# Patient Record
Sex: MALE | Race: WHITE | Employment: FULL TIME | URBAN - METROPOLITAN AREA
[De-identification: names, ages, dates, MRNs, and addresses within clinical notes are randomized per-mention and may not be internally consistent; named-entity substitution may affect disease eponyms.]

---

## 2018-08-18 ENCOUNTER — OFFICE VISIT (OUTPATIENT)
Dept: ORTHOPEDICS | Facility: CLINIC | Age: 58
End: 2018-08-18
Payer: OTHER MISCELLANEOUS

## 2018-08-18 VITALS
WEIGHT: 168.8 LBS | BODY MASS INDEX: 25 KG/M2 | DIASTOLIC BLOOD PRESSURE: 90 MMHG | HEART RATE: 69 BPM | HEIGHT: 69 IN | SYSTOLIC BLOOD PRESSURE: 149 MMHG

## 2018-08-18 DIAGNOSIS — G56.02 LEFT CARPAL TUNNEL SYNDROME: ICD-10-CM

## 2018-08-18 DIAGNOSIS — R20.2 PARESTHESIAS IN LEFT HAND: Primary | ICD-10-CM

## 2018-08-18 PROCEDURE — 99203 OFFICE O/P NEW LOW 30 MIN: CPT | Performed by: PHYSICAL MEDICINE & REHABILITATION

## 2018-08-18 NOTE — MR AVS SNAPSHOT
After Visit Summary   8/18/2018    Bandar Warren    MRN: [id number]           Patient Information     Date Of Birth          1960        Visit Information        Provider Department      8/18/2018 8:00 AM Noemy Medley MD [facility] [place]        Today's Diagnoses     Paresthesias in left hand    -  1      Care Instructions    -EMG ordered of the left upper extremity.  -Neutral wrist brace provided to wear at night time.  -Home exercises provided.  Please do 5-6 days of exercises per week.  -Also discussed steroid injection and referral to orthopedic surgery.    Follow up at least 1-2 business days after the EMG has been completed to ensure we have the report.  Please call with questions or concerns.     -Patient to follow up with Primary Care provider regarding elevated blood pressure.            Follow-ups after your visit        Additional Services     ORTHO  REFERRAL       [facility] is referring you to the Orthopedic  Services at [facility].       The  Representative will assist you in the coordination of your Orthopedic and Musculoskeletal Care as prescribed by your physician.    The  Representative will call you within 24 hours to help schedule your appointment, or you may contact the  Representative at:    [place] [facility] ~ [phone]  [facility] ~ [phone]  [facility] ~ [phone]    Type of Referral : Patient needs Left upper extremity EMG to evaluate for paresthesia of the left hand. Please schedule patient for EMG with location and provider convenient for the patient.    Please fax EMG results to: Dr. Medley - [phone]         Timeframe requested: 3 - 5 days     Coverage of these services is subject to the terms and limitations of your health insurance plan.  Please call member services at your health plan with any  "benefit or coverage questions.      If X-rays, CT or MRI's have been performed, please contact the facility where they were done to arrange for , prior to your scheduled appointment.  Please bring this referral request to your appointment and present it to your specialist.                  Who to contact     If you have questions or need follow up information about today's clinic visit or your schedule please contact [facility] directly at [phone].  Normal or non-critical lab and imaging results will be communicated to you by SocialVolthart, letter or phone within 4 business days after the clinic has received the results. If you do not hear from us within 7 days, please contact the clinic through [employer] or phone. If you have a critical or abnormal lab result, we will notify you by phone as soon as possible.  Submit refill requests through [employer] or call your pharmacy and they will forward the refill request to us. Please allow 3 business days for your refill to be completed.          Additional Information About Your Visit        Kextil Information     [employer]il lets you send messages to your doctor, view your test results, renew your prescriptions, schedule appointments and more. To sign up, go to [email or web address] . Click on \"Log in\" on the left side of the screen, which will take you to the Welcome page. Then click on \"Sign up Now\" on the right side of the page.     You will be asked to enter the access code listed below, as well as some personal information. Please follow the directions to create your username and password.     Your access code is: [email or web address]  Expires: 2018  8:27 AM     Your access code will  in 90 days. If you need help or a new code, please call your [place] clinic or [phone].        Care EveryWhere ID     This is your Care EveryWhere ID. This could be used by other organizations to access your [place] medical " "records  [id number]        Your Vitals Were     Pulse Height BMI (Body Mass Index)             69 5' 8.54\" (1.741 m) 25.26 kg/m2          Blood Pressure from Last 3 Encounters:   08/18/18 149/90    Weight from Last 3 Encounters:   08/18/18 168 lb 12.8 oz (76.6 kg)              We Performed the Following     ORTHO  REFERRAL        Primary Care Provider Fax #    Physician No Ref-Primary [phone]       No address on file        Equal Access to Services     SUDARSHAN CHICAS : Hadii aad ku hadasho Soomaali, waaxda luqadaha, qaybta kaalmada adeegyada, waxjanis idiin hayaan nixon lawkendrickgilberto ingram . So [facility] [phone].    ATENCIÓN: Si habla español, tiene a acuña disposición servicios gratuitos de asistencia lingüística. Llame al [phone].    We comply with applicable federal civil rights laws and [place] laws. We do not discriminate on the basis of race, color, national origin, age, disability, sex, sexual orientation, or gender identity.            Thank you!     Thank you for choosing [facility]  for your care. Our goal is always to provide you with excellent care. Hearing back from our patients is one way we can continue to improve our services. Please take a few minutes to complete the written survey that you may receive in the mail after your visit with us. Thank you!             Your Updated Medication List - Protect others around you: Learn how to safely use, store and throw away your medicines at www.disposemymeds.org.      Notice  As of 8/18/2018  8:27 AM    You have not been prescribed any medications.      "

## 2018-08-18 NOTE — PROGRESS NOTES
Sports Medicine Clinic Visit      CC: Patient presents with:  Left Hand - Pain      HPI:  Bandar Warren is a 58 year old male who is seen as a self referral.   He notes left hand pain that has been bothering him off and on for ~ 10 years. He notes numbness that has gotten worse in the past week. He notes numbness in the 1st 3 fingers of the left hand.  Symptoms are relieved with no treatment tried to date. Symptoms are worsened by holding a paint tray in his hand for prolonged periods. He endorses numbness and tingling.   He denies swelling, bruising, popping, grinding, catching, locking, instability and weakness.  Other treatment has included nothing.       Review of Systems:  Musculoskeletal: as above  Remainder of review of systems is negative including constitutional, eyes, ENT, CV, pulmonary, GI, , endocrine, skin, hematologic, and neurologic except as noted in HPI or medical history.    History reviewed. No pertinent past surgical/medical/family/social history other than as mentioned in HPI.    There is no problem list on file for this patient.    Past Medical History:   Diagnosis Date     NO ACTIVE PROBLEMS      Past Surgical History:   Procedure Laterality Date     APPENDECTOMY  1970's     ORTHOPEDIC SURGERY      Knee - ACL and MCL     No family history on file.  Social History     Social History     Marital status:      Spouse name: N/A     Number of children: N/A     Years of education: N/A     Occupational History     Not on file.     Social History Main Topics     Smoking status: Never Smoker     Smokeless tobacco: Current User     Alcohol use Not on file     Drug use: Not on file     Sexual activity: Not on file     Other Topics Concern     Not on file     Social History Narrative     No narrative on file       He works as a     No current outpatient prescriptions on file.     No current facility-administered medications for this visit.      Allergies   Allergen Reactions     Poison Ivy  "Extract [Extract Of Poison Ivy]          Objective:  /90  Pulse 69  Ht 5' 8.54\" (1.741 m)  Wt 168 lb 12.8 oz (76.6 kg)  BMI 25.26 kg/m2    General: Alert and in no distress    Head: Normocephalic, atraumatic  Eyes: no scleral icterus or conjunctival erythema   Oropharynx:  Mucous membranes moist  Skin: no erythema, petechiae, or jaundice  CV: regular rhythm by palpation, 2+ distal pulses  Resp: normal respiratory effort without conversational dyspnea   Psych: normal mood and affect    Neuro: Motor strength and sensation as noted below    Musculoskeletal:    Bilateral Wrist and Hand exam    Inspection:       Left volar radial cyst    ROM:       Full and symmetric active and passive range of motion of the forearm, wrist and digits bilaterally    Strength:  Forearm supination 5/5 bilaterally  Forearm pronation 5/5 bilaterally  Wrist extension 5/5 bilaterally  Wrist flexion 5/5 bilaterally   strength 5/5 bilaterally  Finger abduction 5/5 bilaterally  Thumb opposition 5/5 bilaterally    Special Tests:        neg (-) Tinel's test bilaterally        positive (+) Phalen's test left    Neurovascular:       2+ radial pulses bilaterally      Altered sensation to light touch over the left 1st-3rd digits and radial hand      Radiology:  No imaging today    Assessment:  1. Paresthesias in left hand    2. Left carpal tunnel syndrome        Plan:  Discussed the assessment with the patient and developed a plan together:  -Discussed anatomy and pathophysiology of carpal tunnel and median nerve.  -Neutral wrist brace provided to wear at night time.  -Home exercises provided.  Please do 5-6 days of exercises per week.  -Also discussed EMG to confirm diagnosis if surgery is being considered.  He would like to proceed with this at this time.  Ordered a left upper extremity EMG.  -Also discussed steroid injection and referral to orthopedic surgery.    Follow up at least 1-2 business days after the EMG has been completed to " ensure we have the report.  Please call with questions or concerns.     -Patient to follow up with Primary Care provider regarding elevated blood pressure.    Theodora Medley MD, CAQ Sports Medicine  [facility]

## 2018-08-18 NOTE — PATIENT INSTRUCTIONS
-EMG ordered of the left upper extremity.  -Neutral wrist brace provided to wear at night time.  -Home exercises provided.  Please do 5-6 days of exercises per week.  -Also discussed steroid injection and referral to orthopedic surgery.    Follow up at least 1-2 business days after the EMG has been completed to ensure we have the report.  Please call with questions or concerns.     -Patient to follow up with Primary Care provider regarding elevated blood pressure.

## 2018-08-18 NOTE — LETTER
8/18/2018         RE: Bandar Warren  [address]  [place] [address]        Dear Colleague,    Thank you for referring your patient, Bandar Warren, to the [facility]. Please see a copy of my visit note below.    Sports Medicine Clinic Visit      CC: Patient presents with:  Left Hand - Pain      HPI:  Bandar Warren is a 58 year old male who is seen as a self referral.   He notes left hand pain that has been bothering him off and on for ~ 10 years. He notes numbness that has gotten worse in the past week. He notes numbness in the 1st 3 fingers of the left hand.  Symptoms are relieved with no treatment tried to date. Symptoms are worsened by holding a paint tray in his hand for prolonged periods. He endorses numbness and tingling.   He denies swelling, bruising, popping, grinding, catching, locking, instability and weakness.  Other treatment has included nothing.       Review of Systems:  Musculoskeletal: as above  Remainder of review of systems is negative including constitutional, eyes, ENT, CV, pulmonary, GI, , endocrine, skin, hematologic, and neurologic except as noted in HPI or medical history.    History reviewed. No pertinent past surgical/medical/family/social history other than as mentioned in HPI.    There is no problem list on file for this patient.    Past Medical History:   Diagnosis Date     NO ACTIVE PROBLEMS      Past Surgical History:   Procedure Laterality Date     APPENDECTOMY  1970's     ORTHOPEDIC SURGERY      Knee - ACL and MCL     No family history on file.  Social History     Social History     Marital status:      Spouse name: N/A     Number of children: N/A     Years of education: N/A     Occupational History     Not on file.     Social History Main Topics     Smoking status: Never Smoker     Smokeless tobacco: Current User     Alcohol use Not on file     Drug use: Not on file     Sexual activity: Not on file     Other Topics Concern      "Not on file     Social History Narrative     No narrative on file       He works as a     No current outpatient prescriptions on file.     No current facility-administered medications for this visit.      Allergies   Allergen Reactions     Poison Ivy Extract [Extract Of Poison Ivy]          Objective:  /90  Pulse 69  Ht 5' 8.54\" (1.741 m)  Wt 168 lb 12.8 oz (76.6 kg)  BMI 25.26 kg/m2    General: Alert and in no distress    Head: Normocephalic, atraumatic  Eyes: no scleral icterus or conjunctival erythema   Oropharynx:  Mucous membranes moist  Skin: no erythema, petechiae, or jaundice  CV: regular rhythm by palpation, 2+ distal pulses  Resp: normal respiratory effort without conversational dyspnea   Psych: normal mood and affect    Neuro: Motor strength and sensation as noted below    Musculoskeletal:    Bilateral Wrist and Hand exam    Inspection:       Left volar radial cyst    ROM:       Full and symmetric active and passive range of motion of the forearm, wrist and digits bilaterally    Strength:  Forearm supination 5/5 bilaterally  Forearm pronation 5/5 bilaterally  Wrist extension 5/5 bilaterally  Wrist flexion 5/5 bilaterally   strength 5/5 bilaterally  Finger abduction 5/5 bilaterally  Thumb opposition 5/5 bilaterally    Special Tests:        neg (-) Tinel's test bilaterally        positive (+) Phalen's test left    Neurovascular:       2+ radial pulses bilaterally      Altered sensation to light touch over the left 1st-3rd digits and radial hand      Radiology:  No imaging today    Assessment:  1. Paresthesias in left hand    2. Left carpal tunnel syndrome        Plan:  Discussed the assessment with the patient and developed a plan together:  -Discussed anatomy and pathophysiology of carpal tunnel and median nerve.  -Neutral wrist brace provided to wear at night time.  -Home exercises provided.  Please do 5-6 days of exercises per week.  -Also discussed EMG to confirm diagnosis if " surgery is being considered.  He would like to proceed with this at this time.  Ordered a left upper extremity EMG.  -Also discussed steroid injection and referral to orthopedic surgery.    Follow up at least 1-2 business days after the EMG has been completed to ensure we have the report.  Please call with questions or concerns.     -Patient to follow up with Primary Care provider regarding elevated blood pressure.    Theodora Medley MD, [facility] Sports Medicine  [facility]      Again, thank you for allowing me to participate in the care of your patient.        Sincerely,        Noemy Medley MD

## 2018-09-05 ENCOUNTER — TRANSFERRED RECORDS (OUTPATIENT)
Dept: HEALTH INFORMATION MANAGEMENT | Facility: CLINIC | Age: 58
End: 2018-09-05

## 2018-09-10 ENCOUNTER — OFFICE VISIT (OUTPATIENT)
Dept: ORTHOPEDICS | Facility: CLINIC | Age: 58
End: 2018-09-10
Payer: OTHER MISCELLANEOUS

## 2018-09-10 VITALS
SYSTOLIC BLOOD PRESSURE: 132 MMHG | DIASTOLIC BLOOD PRESSURE: 92 MMHG | BODY MASS INDEX: 25.41 KG/M2 | HEART RATE: 97 BPM | RESPIRATION RATE: 16 BRPM | WEIGHT: 169.8 LBS

## 2018-09-10 DIAGNOSIS — R22.32 MASS OF HAND, LEFT: ICD-10-CM

## 2018-09-10 DIAGNOSIS — G56.02 CARPAL TUNNEL SYNDROME OF LEFT WRIST: ICD-10-CM

## 2018-09-10 DIAGNOSIS — M67.432 GANGLION CYST OF WRIST, LEFT: Primary | ICD-10-CM

## 2018-09-10 PROCEDURE — 99244 OFF/OP CNSLTJ NEW/EST MOD 40: CPT | Performed by: ORTHOPAEDIC SURGERY

## 2018-09-10 ASSESSMENT — PAIN SCALES - GENERAL: PAINLEVEL: MILD PAIN (3)

## 2018-09-10 NOTE — LETTER
9/10/2018         RE: Bandar Warren  [address] [place] [address]        Dear Colleague,    Thank you for referring your patient, Bandar Warren, to the [facility]. Please see a copy of my visit note below.    CHIEF COMPLAINT:   Chief Complaint   Patient presents with     Left Hand - Pain     Referred by Dr Medley for left CTS. Left hand pain and numbness bothering him off and on 10 yrs. Worsening over the last 3 weeks. No treatments. EMG done 9/5/18 at [facility].     Bandar Warren is seen today in the [facility] for evaluation of left carpal tunnel syndrome  at the request of Dr. Theodora Medley MD      HISTORY:  Bandar Warren is a 58 year old male , right-hand dominant, with left hand pain, numbness and tingling. Symptoms have been present for 10+ years, intermittent. Symptoms have progressed over the last 3 weeks. He has constant numbness in the radial 3 fingers. He reports a history of a volar- radial ganglion cyst that has shrunk recently. Mild pain today, 3/10. He has pain at night. Denies right sided issues.       Suspected cause: Due to unknown activities.    Pain severity: mild pain  Pain quality: aching  Frequency of symptoms: frequently to constantly  Aggravating Factors: with using the left hand.  Relieving Factors: rest  Previous modalities tried: night brace  Prior wrist injury/trauma: none    Usual level of recreational activity: sedentary  Usual level of work activity: Occasional climbing -     Orthopedic PMH: none    Other PMH:  has a past medical history of NO ACTIVE PROBLEMS.      Surgical Hx:  has a past surgical history that includes appendectomy (1970's) and orthopedic surgery.    Medications: No current outpatient prescriptions on file.    Allergies:   Allergies   Allergen Reactions     Poison Ivy Extract [Extract Of Poison Ivy]        Social Hx: .  reports that he has never smoked. He uses smokeless  tobacco.    Family Hx: reviewed, none pertinent.    REVIEW OF SYSTEMS: 10 point ROS neg other than the symptoms noted above in the HPI and PMH. Notables include  CONSTITUTIONAL:NEGATIVE for fever, chills, change in weight  INTEGUMENTARY/SKIN: NEGATIVE for worrisome rashes, moles or lesions  MUSCULOSKELETAL:See HPI above  Neurology: see HPI above.    This document serves as a record of the services and decisions personally performed and made by Missael Mcdonald MD. It was created on his behalf by Elsa Colon, a trained medical scribe. The creation of this document is based the provider's statements to the medical scribe.    Scribe Elsa Colon 3:16 PM 9/10/2018     EXAM:  BP (!) 132/92 (BP Location: Right arm, Patient Position: Sitting, Cuff Size: Adult Regular)  Pulse 97  Resp 16  Wt 169 lb 12.8 oz (77 kg)  BMI 25.41 kg/m2  GENERAL APPEARANCE: healthy, alert and no distress   GAIT: NORMAL  SKIN: no suspicious lesions or rashes; some dried white paint  RESPIRATORY: No increased work of breathing.  NEURO:     strength: normal right, decreased left.   thenar fasiculations: negative    Thenar atrophy: slight right, mild-moderate left    Sensation intact in ulnar 2 fingers, decreased in radial 3 fingers left hand; intact all right hand.     reflexes normal in upper extremities.   PSYCH:  mentation appears normal and affect normal, not anxious.  LYMPH: no palpable epitrochlear lymphadenopathy about the left elbow.    MUSCULOSKELETAL:    RIGHT HAND/FINGERS:    Skin intact. No abnormal skin discoloration, erythema or ecchymosis.   Degenerative deformity of the thumb.  No nail pitting or clubbing.  Normal wear pattern, color and tone.  No observable or palpable masses of the fingers or palm or wrist.  No palpable triggering of fingers.   No observable or palpable cords or nodules of the fingers or palm.    There is no swelling in the wrist, hand, forearm.  There is no tenderness in the wrist.  There is no  ecchymosis.  There is no erythema of the surrounding skin.  There is no maceration of the skin.  There is no deformity in the area.  Intact extensors. No extensor lag.    Special tests wrist:    Tinel's negative,    Phalen's not tested.    Flexion/compression test negative.    1st carpometacarpal grind: mild.    Intact sensation light touch median, radial, ulnar nerves of the hand  Intact sensation to the radial and ulnar digital nerves of the fingers, as well as the finger tips.  Intact epl fpl fdp edc wrist flexion/extension biceps/triceps deltoid  Brisk capillary refill to all fingers.   Palpable radial pulse, 2+.      LEFT HAND/FINGERS:    Skin intact. No abnormal skin discoloration, erythema or ecchymosis.   Degenerative deformity of the thumb.  No nail pitting or clubbing.  Normal wear pattern, color and tone.  ~1.5 cm cystic mass located volar-radial wrist, adjacent to palpable radial pulse, just ulnar to mass.   Several millimeter cutaneous nodule of the palm, just radial to flexor tendon of the small finger.  No palpable triggering of fingers.     There is no swelling in the wrist, hand, forearm.  There is no tenderness in the wrist.  There is no ecchymosis.  There is no erythema of the surrounding skin.  There is no maceration of the skin.  There is no deformity in the area.  Intact extensors. No extensor lag.    Special tests wrist:    Tinel's negative,    Flexion/compression test negative.    1st carpometacarpal grind: negative    Intact sensation light touch radial, ulnar nerves of the hand; decreased sensation of the median nerve distribution of the finger tips of the thumb, index and long fingers.  Intact epl fpl fdp edc wrist flexion/extension biceps/triceps deltoid  Brisk capillary refill to all fingers.   Palpable radial pulse, 2+.    XRAYS: none taken today.    SPECIAL STUDIES:  EMG done on 9/6/2018 by [facility].  Impression:  1. Severe left median neuropathy across the wrist  (carpal tunnel syndrome), with severe focal motor demyelination and loss of sensory/motor axons   2. There is no evidence of brachial plexopathy or cervical radiculopathy.       ASSESSMENT: 58 year old male with:  1. Severe left carpal tunnel syndrome  2. Left volar radial wrist mass, consistent with ganglion cyst  3. Left hand, palmar, cutaneous mass    PLAN:   Numbness and tingling:  * discussed with patient signs and symptoms consistent with carpal tunnel syndrome. Carpal tunnel syndrome is compression or pinching of the median nerve at the wrist, as it enters the hand. There are many different causes, and in most cases, multifactorial.    * An indepth discussion was had with him about the options for treatment, which included activity modification to avoid aggravating activities, taking breaks during activities that cause symptoms, stretching, NSAIDS to help decrease inflammation and swelling within the carpal tunnel, night splinting, corticosteroid injections, and carpal tunnel release.   * depending upon severity and duration of symptoms, nonoperative treatment is usually initiated, starting with least invasive modalities such as activity modification and a trial of night splints and NSAIDs.  * Cortisone injections are considered to decrease swelling and inflammation within the carpal tunnel and compression of the nerve.   * Lastly, carpal tunnel release should symptoms persist despite trial of nonoperative treatment, or in cases of severe carpal tunnel syndrome.  * risks of surgery, including, but not limited to: bleeding, infection, pain, scar, damage to adjacent structures (nerves, vessels, tendons), temporary versus permanent nerve injury, failure to relieve symptoms, recurrence of symptoms, incomplete release, stiffness, scar sensitivity and tenderness, need for further surgery, risks of anesthesia were discussed.  * in some cases, with severe, prolonged symptoms, or in situations of underlying peripheral  neuropathy, there may be permanent nerve changes not amenable to surgery, that even with surgery, may not resolve. He has some changes noted on EMG that might indicate that even with surgery, in his case, may not have complete resolution of symptoms.    VOLAR WRIST MASS - ganglion cyst  Discussed with patient that the mass is consistent with ganglion cyst, a common, benign tumor of the upper extremity. Less likely another type of tumor or neoplasm. Treatment options include: observation if asymptomatic or minimal symptoms, activity modification, splint, aspiration with cortisone injection (risks of recurrence > 50%), or surgical excision (risk of recurrence < 5-10%). Risks and benefits of each discussed in detail.    * in particular, risks of surgery include, but not limited to: bleeding, infection, pain, scar, damage to adjacent structures (nerves, vessels, bone, cartilage, tendons, ligaments), temporary versus permanent nerve injury, failure to relieve symptoms, recurrence of symptoms or recurrence of the mass, stiffness, contracture, need for further surgery, risks of anesthesia, blood clots, death.    HAND MASS  * discussed hand mass likely callus/foreign body/fibrous nodule versus other.  * treatment with observation, surgical excision.  Risks and benefits of each discussed in detail.    * in particular, risks of surgery include, but not limited to: bleeding, infection, pain, scar, damage to adjacent structures (nerves, vessels, bone, cartilage, tendons, ligaments), temporary versus permanent nerve injury, failure to relieve symptoms, recurrence of symptoms or recurrence of the mass, stiffness, contracture, need for further surgery, risks of anesthesia, blood clots, death.      After lengthy discussion, patient elects for surgery.    * plan: open left carpal tunnel release, left volar ganglion cyst excision, left palmar mass excision, outpatient procedure, MAC with [facility]  * will schedule in future at a  mutual convenience  * patient to obtain H+P prior to surgery  * return to clinic 2 weeks postoperative for wound check, suture removal, sooner if needed.  * all patient's questions addressed and answered today.  * Patient to follow up with Primary Care provider regarding elevated blood pressure     The information in this document, created by a scribe for me, accurately reflects the services I personally performed and the decisions made by me. I have reviewed and approved this document for accuracy.      Missael Mcdonald M.D., M.S.  Dept. of Orthopaedic Surgery  [facility]      Again, thank you for allowing me to participate in the care of your patient.        Sincerely,        Missael Mcdonald MD

## 2018-09-10 NOTE — PROGRESS NOTES
CHIEF COMPLAINT:   Chief Complaint   Patient presents with     Left Hand - Pain     Referred by Dr Medley for left CTS. Left hand pain and numbness bothering him off and on 10 yrs. Worsening over the last 3 weeks. No treatments. EMG done 9/5/18 at [facility].     Bandar Warren is seen today in the [facility] for evaluation of left carpal tunnel syndrome  at the request of Dr. Theodora Medley MD      HISTORY:  Bandar Warren is a 58 year old male , right-hand dominant, with left hand pain, numbness and tingling. Symptoms have been present for 10+ years, intermittent. Symptoms have progressed over the last 3 weeks. He has constant numbness in the radial 3 fingers. He reports a history of a volar- radial ganglion cyst that has shrunk recently. Mild pain today, 3/10. He has pain at night. Denies right sided issues.       Suspected cause: Due to unknown activities.    Pain severity: mild pain  Pain quality: aching  Frequency of symptoms: frequently to constantly  Aggravating Factors: with using the left hand.  Relieving Factors: rest  Previous modalities tried: night brace  Prior wrist injury/trauma: none    Usual level of recreational activity: sedentary  Usual level of work activity: Occasional climbing - [place]    Orthopedic PMH: none    Other PMH:  has a past medical history of NO ACTIVE PROBLEMS.      Surgical Hx:  has a past surgical history that includes appendectomy (1970's) and orthopedic surgery.    Medications: No current outpatient prescriptions on file.    Allergies:   Allergies   Allergen Reactions     Poison Ivy Extract [Extract Of Poison Ivy]        Social Hx: .  reports that he has never smoked. He uses smokeless tobacco.    Family Hx: reviewed, none pertinent.    REVIEW OF SYSTEMS: 10 point ROS neg other than the symptoms noted above in the HPI and PMH. Notables include  CONSTITUTIONAL:NEGATIVE for fever, chills, change in weight  INTEGUMENTARY/SKIN: NEGATIVE for  worrisome rashes, moles or lesions  MUSCULOSKELETAL:See HPI above  Neurology: see HPI above.    This document serves as a record of the services and decisions personally performed and made by Missael Mcdonald MD. It was created on his behalf by Elsa Colon, a trained medical scribe. The creation of this document is based the provider's statements to the medical scribe.    Scribe Elsa Colon 3:16 PM 9/10/2018     EXAM:  BP (!) 132/92 (BP Location: Right arm, Patient Position: Sitting, Cuff Size: Adult Regular)  Pulse 97  Resp 16  Wt 169 lb 12.8 oz (77 kg)  BMI 25.41 kg/m2  GENERAL APPEARANCE: healthy, alert and no distress   GAIT: NORMAL  SKIN: no suspicious lesions or rashes; some dried white paint  RESPIRATORY: No increased work of breathing.  NEURO:     strength: normal right, decreased left.   thenar fasiculations: negative    Thenar atrophy: slight right, mild-moderate left    Sensation intact in ulnar 2 fingers, decreased in radial 3 fingers left hand; intact all right hand.     reflexes normal in upper extremities.   PSYCH:  mentation appears normal and affect normal, not anxious.  LYMPH: no palpable epitrochlear lymphadenopathy about the left elbow.    MUSCULOSKELETAL:    RIGHT HAND/FINGERS:    Skin intact. No abnormal skin discoloration, erythema or ecchymosis.   Degenerative deformity of the thumb.  No nail pitting or clubbing.  Normal wear pattern, color and tone.  No observable or palpable masses of the fingers or palm or wrist.  No palpable triggering of fingers.   No observable or palpable cords or nodules of the fingers or palm.    There is no swelling in the wrist, hand, forearm.  There is no tenderness in the wrist.  There is no ecchymosis.  There is no erythema of the surrounding skin.  There is no maceration of the skin.  There is no deformity in the area.  Intact extensors. No extensor lag.    Special tests wrist:    Tinel's negative,    Phalen's not tested.    Flexion/compression test  negative.    1st carpometacarpal grind: mild.    Intact sensation light touch median, radial, ulnar nerves of the hand  Intact sensation to the radial and ulnar digital nerves of the fingers, as well as the finger tips.  Intact epl fpl fdp edc wrist flexion/extension biceps/triceps deltoid  Brisk capillary refill to all fingers.   Palpable radial pulse, 2+.      LEFT HAND/FINGERS:    Skin intact. No abnormal skin discoloration, erythema or ecchymosis.   Degenerative deformity of the thumb.  No nail pitting or clubbing.  Normal wear pattern, color and tone.  ~1.5 cm cystic mass located volar-radial wrist, adjacent to palpable radial pulse, just ulnar to mass.   Several millimeter cutaneous nodule of the palm, just radial to flexor tendon of the small finger.  No palpable triggering of fingers.     There is no swelling in the wrist, hand, forearm.  There is no tenderness in the wrist.  There is no ecchymosis.  There is no erythema of the surrounding skin.  There is no maceration of the skin.  There is no deformity in the area.  Intact extensors. No extensor lag.    Special tests wrist:    Tinel's negative,    Flexion/compression test negative.    1st carpometacarpal grind: negative    Intact sensation light touch radial, ulnar nerves of the hand; decreased sensation of the median nerve distribution of the finger tips of the thumb, index and long fingers.  Intact epl fpl fdp edc wrist flexion/extension biceps/triceps deltoid  Brisk capillary refill to all fingers.   Palpable radial pulse, 2+.    XRAYS: none taken today.    SPECIAL STUDIES:  EMG done on 9/6/2018 by [facility].  Impression:  1. Severe left median neuropathy across the wrist (carpal tunnel syndrome), with severe focal motor demyelination and loss of sensory/motor axons   2. There is no evidence of brachial plexopathy or cervical radiculopathy.       ASSESSMENT: 58 year old male with:  1. Severe left carpal tunnel syndrome  2. Left volar  radial wrist mass, consistent with ganglion cyst  3. Left hand, palmar, cutaneous mass    PLAN:   Numbness and tingling:  * discussed with patient signs and symptoms consistent with carpal tunnel syndrome. Carpal tunnel syndrome is compression or pinching of the median nerve at the wrist, as it enters the hand. There are many different causes, and in most cases, multifactorial.    * An indepth discussion was had with him about the options for treatment, which included activity modification to avoid aggravating activities, taking breaks during activities that cause symptoms, stretching, NSAIDS to help decrease inflammation and swelling within the carpal tunnel, night splinting, corticosteroid injections, and carpal tunnel release.   * depending upon severity and duration of symptoms, nonoperative treatment is usually initiated, starting with least invasive modalities such as activity modification and a trial of night splints and NSAIDs.  * Cortisone injections are considered to decrease swelling and inflammation within the carpal tunnel and compression of the nerve.   * Lastly, carpal tunnel release should symptoms persist despite trial of nonoperative treatment, or in cases of severe carpal tunnel syndrome.  * risks of surgery, including, but not limited to: bleeding, infection, pain, scar, damage to adjacent structures (nerves, vessels, tendons), temporary versus permanent nerve injury, failure to relieve symptoms, recurrence of symptoms, incomplete release, stiffness, scar sensitivity and tenderness, need for further surgery, risks of anesthesia were discussed.  * in some cases, with severe, prolonged symptoms, or in situations of underlying peripheral neuropathy, there may be permanent nerve changes not amenable to surgery, that even with surgery, may not resolve. He has some changes noted on EMG that might indicate that even with surgery, in his case, may not have complete resolution of symptoms.    VOLAR WRIST  MASS - ganglion cyst  Discussed with patient that the mass is consistent with ganglion cyst, a common, benign tumor of the upper extremity. Less likely another type of tumor or neoplasm. Treatment options include: observation if asymptomatic or minimal symptoms, activity modification, splint, aspiration with cortisone injection (risks of recurrence > 50%), or surgical excision (risk of recurrence < 5-10%). Risks and benefits of each discussed in detail.    * in particular, risks of surgery include, but not limited to: bleeding, infection, pain, scar, damage to adjacent structures (nerves, vessels, bone, cartilage, tendons, ligaments), temporary versus permanent nerve injury, failure to relieve symptoms, recurrence of symptoms or recurrence of the mass, stiffness, contracture, need for further surgery, risks of anesthesia, blood clots, death.    HAND MASS  * discussed hand mass likely callus/foreign body/fibrous nodule versus other.  * treatment with observation, surgical excision.  Risks and benefits of each discussed in detail.    * in particular, risks of surgery include, but not limited to: bleeding, infection, pain, scar, damage to adjacent structures (nerves, vessels, bone, cartilage, tendons, ligaments), temporary versus permanent nerve injury, failure to relieve symptoms, recurrence of symptoms or recurrence of the mass, stiffness, contracture, need for further surgery, risks of anesthesia, blood clots, death.      After lengthy discussion, patient elects for surgery.    * plan: open left carpal tunnel release, left volar ganglion cyst excision, left palmar mass excision, outpatient procedure, MAC with [place] [facility]  * will schedule in future at a mutual convenience  * patient to obtain H+P prior to surgery  * return to clinic 2 weeks postoperative for wound check, suture removal, sooner if needed.  * all patient's questions addressed and answered today.  * Patient to follow up with Primary Care provider  regarding elevated blood pressure     The information in this document, created by a scribe for me, accurately reflects the services I personally performed and the decisions made by me. I have reviewed and approved this document for accuracy.      Missael Mcdonald M.D., M.S.  Dept. of Orthopaedic Surgery  [facility]

## 2018-09-11 ENCOUNTER — TELEPHONE (OUTPATIENT)
Dept: ORTHOPEDICS | Facility: CLINIC | Age: 58
End: 2018-09-11

## 2018-09-20 ENCOUNTER — TELEPHONE (OUTPATIENT)
Dept: FAMILY MEDICINE | Facility: CLINIC | Age: 58
End: 2018-09-20

## 2018-09-20 NOTE — TELEPHONE ENCOUNTER
Reason for call:  Note request   Patient called regarding (reason for call): Patient is requesting for his employer that he needs surgery. He is asking it be mailed to him when done.  Please call patient with any questions  Additional comments: please call if any questions      Phone number to reach patient:[phone]  Best Time: after 2pm    Can we leave a detailed message on this number?  YES

## 2018-09-20 NOTE — LETTER
[address]  [place] [address]  [phone]      WORKABILITY    [facility], Dr. Missael Mcdonald M.D., ARABELLA ClineLuanne Fridley        9/20/2018      RE: Bandar Warren    [address]  [place] [address]        To whom it may concern:     Bandar Warren is under my professional care for left carpal tunnel syndrome and left wrist mass.      Date of Surgery: TBD.    Mr. Warren was seen in clinic by Dr. Missael Mcdonald on 9/10/18. At that consult it was mutually decided upon to have left carpal tunnel release and left wrist mass excision surgery. A date for this procedure has not been established at this time. Mr. Warren is OK to schedule surgery at his convenience from Dr. Missael Mcdonald's perspective.           aJmes Rosas PA-C, CAQ (Ortho)  Supervising Physician: Missael Mcdonald M.D., M.S.  Dept. of Orthopaedic Surgery  [facility]

## 2018-09-20 NOTE — TELEPHONE ENCOUNTER
"I spoke to Bandar to discuss the note he wants for his employer. He basically wants a note to give them the \"heads up\" that he will be having surgery sometime in the future. I wrote this letter and sent it to him via postal mail along with a copy of his [place] EMG results.    James Rosas PA-C, CAQ (Ortho)  Supervising Physician: Missael Mcdonald M.D., M.S.  Dept. of Orthopaedic Surgery  [facility]    "

## 2018-10-15 NOTE — TELEPHONE ENCOUNTER
Type of surgery: open left carpal tunnel release, left palmar hand mass excision, left wrist ganglion cyst excision  CPT 18084, 46828, 79740  Ganglion cyst of wrist, left [M67.432]  - Primary       Carpal tunnel syndrome of left wrist [G56.02]       Mass of hand, left [R22.32]         Location of surgery: [facility]  Date and time of surgery: 11-13-18  Surgeon: Dr Mcdonald  Pre-Op Appt Date: TBD  Post-Op Appt Date: 11-26-18   Packet sent out: Yes  Pre-cert/Authorization completed: no pre cert needed, per online form for [employer]    Date: 10/16/2018

## 2018-10-22 NOTE — TELEPHONE ENCOUNTER
Paperwork faxed to [facility].     Thank you,   Katelin Hendricks   [facility]  [phone]

## 2018-11-09 ENCOUNTER — TELEPHONE (OUTPATIENT)
Dept: ORTHOPEDICS | Facility: CLINIC | Age: 58
End: 2018-11-09

## 2018-11-09 NOTE — TELEPHONE ENCOUNTER
[facility] is calling looking for patients Preop- I have lvm for patient to call and advise on his preop and noted to him that without a preop surgery will need to be canceled  Paperwork notes that patient was going to schedule preop with a  Outside of [place].[facility]

## 2018-11-09 NOTE — TELEPHONE ENCOUNTER
Reason for Call:  Other call back    Detailed comments: Patient is scheduled for surgery on 11/13/2018 with Dr. Mcdonald for a carpal tunnel release patient is requesting a removal of a sliver just below him ring finger. Please call and advise. Thank you     Phone Number Patient can be reached at: Home number on file [phone] (home)    Best Time: any    Can we leave a detailed message on this number? YES    Call taken on 11/9/2018 at 2:57 PM by Lucille Bernabe

## 2018-11-12 NOTE — TELEPHONE ENCOUNTER
I spoke to female at home number who said Bandar was at work. I let her know that this would be addressed tmrw in pre-op with Dr. Missael Mcdonald. She was appreciative of the call back and was going to pass along the information.    James Rosas PA-C, CAQ (Ortho)  Supervising Physician: Missael Mcdonald M.D., M.S.  Dept. of Orthopaedic Surgery  [facility]

## 2018-11-26 ENCOUNTER — OFFICE VISIT (OUTPATIENT)
Dept: ORTHOPEDICS | Facility: CLINIC | Age: 58
End: 2018-11-26
Payer: OTHER MISCELLANEOUS

## 2018-11-26 VITALS
DIASTOLIC BLOOD PRESSURE: 87 MMHG | SYSTOLIC BLOOD PRESSURE: 134 MMHG | HEIGHT: 69 IN | WEIGHT: 173 LBS | BODY MASS INDEX: 25.62 KG/M2 | RESPIRATION RATE: 16 BRPM

## 2018-11-26 DIAGNOSIS — Z98.890 S/P CARPAL TUNNEL RELEASE: Primary | ICD-10-CM

## 2018-11-26 DIAGNOSIS — Z98.890 S/P EXCISION OF GANGLION CYST: ICD-10-CM

## 2018-11-26 PROCEDURE — 99024 POSTOP FOLLOW-UP VISIT: CPT | Performed by: ORTHOPAEDIC SURGERY

## 2018-11-26 ASSESSMENT — PAIN SCALES - GENERAL: PAINLEVEL: NO PAIN (0)

## 2018-11-26 NOTE — LETTER
[facility]  [address]  [place] [id number] [phone]    WORKABILITY    [facility], Dr. Missael Mcdonald M.D., ARABELLA ClineLuanne ojeda [place]        11/26/2018      RE: Bandar Warren    [address]  [place] [address]        To whom it may concern:     Bandar Warren is under my professional care for   1. S/P carpal tunnel release    2.     Ganglion cyst excision  3.      Hand mass excision    Date of Surgery: 11/13/18.    Mr. Warren should not return to work at this time. He can return to work on 12/3/18 with restrictions: 1lb lifting restriction with left upper extremity. No repetitive use of left upper extremity. Hand must be kept clean and dry at all times.  DURATION OF LIMITATIONS: 4 weeks      Next appointment: 1 month        James Rosas PA-C, CAQ (Ortho)  Supervising Physician: Missael Mcdonald M.D., M.S.  Dept. of Orthopaedic Surgery  [facility]

## 2018-11-26 NOTE — LETTER
11/26/2018         RE: Bandar Warren  [address]  [place] [address]        Dear Colleague,    Thank you for referring your patient, Bandar Warren, to the [facility]. Please see a copy of my visit note below.    Chief Complaint   Patient presents with     Left Hand - Surgical Followup     Left carpal tunnel release, volar ganglion and palmar mass excision. DOS: 11/13/18. Doing well. Remained in splint. Still has numbness in thumb, index and long finger.        SURGERY: left carpal tunnel release, left wrist volar ganglion excision, left hand palmar mass excision. ( [facility])  DATE OF SURGERY: 11/13/2018      HPI: Bandar Warren is a 58 year old male , right -hand dominant, who presents for post-surgical follow-up of a left carpal tunnel release, left volar ganglion excision and left hand palmar mass excision.  It has now been 2 weeks. since surgery. He has remained in the splint. Denies fevers, chills or night sweats. There have been no wound problems. He has been doing active/passive finger range of motion exercises. He reports having mild pain/discomfort around the surgical sites. He states that since surgery, preop symptoms have continued.     Recall his EMG done on 9/6/2018 by [facility].  Impression:  1. Severe left median neuropathy across the wrist (carpal tunnel syndrome), with severe focal motor demyelination and loss of sensory/motor axons   2. There is no evidence of brachial plexopathy or cervical radiculopathy.     OR FINDINGS  lobulated cystic mass from the volar radiocarpal joint filled with thick fluid. Thick transverse carpal ligament. 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        PAST MEDICAL HISTORY:   Past Medical History:   Diagnosis Date     NO ACTIVE PROBLEMS        PAST SURGICAL HISTORY:   Past Surgical History:   Procedure  "Laterality Date     APPENDECTOMY  1970's     ORTHOPEDIC SURGERY      Knee - ACL and MCL       MEDICATIONS:    No current outpatient prescriptions on file.        ALLERGIES:   Allergies   Allergen Reactions     Poison Ivy Extract [Extract Of Poison Ivy]      This document serves as a record of the services and decisions personally performed and made by Missael Mcdonald MD. It was created on their behalf by Nirmal Segal, a trained medical scribe. The creation of this document is based the provider's statements to the medical scribe.    Nirmal Segal November 26, 2018 8:03 AM     PHYSICAL EXAM  GENERAL APPEARANCE: healthy, alert, no distress.   SKIN: no suspicious lesions or rashes  RESPIRATORY: No increased work of breathing.  NEURO: Normal strength and tone, mentation intact and speech normal  PSYCH:  mentation appears normal and affect normal. Not anxious.        /87  Resp 16  Ht 1.74 m (5' 8.5\")  Wt 78.5 kg (173 lb)  BMI 25.92 kg/m2     LEFT HAND / WRIST EXAM:    The splint was removed.  All incisions (carpal tunnel, volar ganglion and palmar hand) healing well with skin edges well approximated and everted. Dry blood surrounding incisions. Resolving ecchymosis.  Sutures in place.   No erythema. No drainage.    There is mild swelling in the volar wrist, hand, notably at the ganglion site.  There is mild tenderness around all incisions.  There is mild resolving ecchymosis around incisions.  There is no erythema of the surrounding skin.  There is no maceration of the skin.  There is no deformity in the area.    Continued sensation loss over the distribution of the median nerve on the left hand.   Brisk capillary refill to all fingers, warm and well-perfused.   Palpable radial pulse.      Pathology [facility]: hand mass consistent with superficial fibromatosis, wrist mass consistent with ganglion cyst.      ASSESSMENT: 58 year old male, 2 weeks status post left carpal tunnel release, volar ganglion " excision and left palmar mass excision, doing well.      PLAN: routine postoperative of carpal tunnel release.    Remove sutures today, soft dressing applied  May wash hands, no aggressive scrubbing for another week  Continue with hand and wrist exercises for motion.  Scar massage and desensitization discussed and demonstrated.  Gradual return to light use of hands for ADLs. No aggressive hand use for another 4 weeks.  Apply Vitamin E oil over scar tissue at 6 weeks  Letter written for employer regarding work restrictions. No use of left hand at work. Return to light duty on 12/3/2018.  Discussed potential for OT; hold for now  Return to clinic 4 weeks for clinical exam, sooner as needed.  It may take 6 months or longer for symptoms to resolve, and in cases of severe carpal tunnel syndrome, symptoms may not completely improve. Given severity of symptoms and demyelination and loss of motor/sensory axons seen on EMG, may not completely improve as we discussed prior to surgery.    The information in this document, created by the medical scribe for me, accurately reflects the services I personally performed and the decisions made by me. I have reviewed and approved this document for accuracy prior to leaving the patient care area.     Missael Mcdonald M.D., M.S.  Dept. of Orthopaedic Surgery  [facility]    Again, thank you for allowing me to participate in the care of your patient.        Sincerely,        Missael Mcdonlad MD

## 2018-11-26 NOTE — MR AVS SNAPSHOT
"              After Visit Summary   11/26/2018    Bandar Warren    MRN: [id number]           Patient Information     Date Of Birth          1960        Visit Information        Provider Department      11/26/2018 8:00 AM Missael Mcdonald MD [facility]        Today's Diagnoses     S/P carpal tunnel release    -  1    S/P excision of ganglion cyst           Follow-ups after your visit        Follow-up notes from your care team     Return in about 4 weeks (around 12/24/2018) for clinical recheck.      Your next 10 appointments already scheduled     Dec 20, 2018  2:30 PM CST   Return Visit with Missael Mcdonald MD   [facility] ([facility])    [address]  [place] [address] [phone]              Who to contact     If you have questions or need follow up information about today's clinic visit or your schedule please contact [facility] directly at [phone].  Normal or non-critical lab and imaging results will be communicated to you by MyChart, letter or phone within 4 business days after the clinic has received the results. If you do not hear from us within 7 days, please contact the clinic through MyChart or phone. If you have a critical or abnormal lab result, we will notify you by phone as soon as possible.  Submit refill requests through Bill Me Later or call your pharmacy and they will forward the refill request to us. Please allow 3 business days for your refill to be completed.          Additional Information About Your Visit        Care EveryWhere ID     This is your Care EveryWhere ID. This could be used by other organizations to access your [facility] medical records  [id number]        Your Vitals Were     Respirations Height BMI (Body Mass Index)             16 5' 8.5\" (1.74 m) 25.92 kg/m2          Blood Pressure from Last 3 Encounters:   11/26/18 134/87 "   09/10/18 (!) 132/92   08/18/18 149/90    Weight from Last 3 Encounters:   11/26/18 173 lb (78.5 kg)   09/10/18 169 lb 12.8 oz (77 kg)   08/18/18 168 lb 12.8 oz (76.6 kg)              Today, you had the following     No orders found for display       Primary Care Provider Fax #    Physician No Ref-Primary [phone]       No address on file        Equal Access to Services     SUDARSHAN CHICAS : Hadii aad ku hadasho Soomaali, waaxda luqadaha, qaybta kaalmada adeegyada, waxay maryjanein kaylenen nixon ingram . So [facility] [phone].    ATENCIÓN: Si habla español, tiene a acuña disposición servicios gratuitos de asistencia lingüística. [facility] [phone].    We comply with applicable federal civil rights laws and [place] laws. We do not discriminate on the basis of race, color, national origin, age, disability, sex, sexual orientation, or gender identity.            Thank you!     Thank you for choosing [facility]  for your care. Our goal is always to provide you with excellent care. Hearing back from our patients is one way we can continue to improve our services. Please take a few minutes to complete the written survey that you may receive in the mail after your visit with us. Thank you!             Your Updated Medication List - Protect others around you: Learn how to safely use, store and throw away your medicines at www.disposemymeds.org.      Notice  As of 11/26/2018  4:38 PM    You have not been prescribed any medications.

## 2018-11-26 NOTE — PROGRESS NOTES
Chief Complaint   Patient presents with     Left Hand - Surgical Followup     Left carpal tunnel release, volar ganglion and palmar mass excision. DOS: 11/13/18. Doing well. Remained in splint. Still has numbness in thumb, index and long finger.        SURGERY: left carpal tunnel release, left wrist volar ganglion excision, left hand palmar mass excision. ( [facility])  DATE OF SURGERY: 11/13/2018      HPI: Bandar Warren is a 58 year old male , right -hand dominant, who presents for post-surgical follow-up of a left carpal tunnel release, left volar ganglion excision and left hand palmar mass excision.  It has now been 2 weeks. since surgery. He has remained in the splint. Denies fevers, chills or night sweats. There have been no wound problems. He has been doing active/passive finger range of motion exercises. He reports having mild pain/discomfort around the surgical sites. He states that since surgery, preop symptoms have continued.     Recall his EMG done on 9/6/2018 by [facility].  Impression:  1. Severe left median neuropathy across the wrist (carpal tunnel syndrome), with severe focal motor demyelination and loss of sensory/motor axons   2. There is no evidence of brachial plexopathy or cervical radiculopathy.     OR FINDINGS  lobulated cystic mass from the volar radiocarpal joint filled with thick fluid. Thick transverse carpal ligament. 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        PAST MEDICAL HISTORY:   Past Medical History:   Diagnosis Date     NO ACTIVE PROBLEMS        PAST SURGICAL HISTORY:   Past Surgical History:   Procedure Laterality Date     APPENDECTOMY  1970's     ORTHOPEDIC SURGERY      Knee - ACL and MCL       MEDICATIONS:    No current outpatient prescriptions on file.        ALLERGIES:   Allergies   Allergen Reactions     Poison Ivy Extract [Extract Of Poison Ivy]      This  "document serves as a record of the services and decisions personally performed and made by Missael Mcdonald MD. It was created on their behalf by Nirmal Segal, a trained medical scribe. The creation of this document is based the provider's statements to the medical scribe.    Nirmal Segal November 26, 2018 8:03 AM     PHYSICAL EXAM  GENERAL APPEARANCE: healthy, alert, no distress.   SKIN: no suspicious lesions or rashes  RESPIRATORY: No increased work of breathing.  NEURO: Normal strength and tone, mentation intact and speech normal  PSYCH:  mentation appears normal and affect normal. Not anxious.        /87  Resp 16  Ht 1.74 m (5' 8.5\")  Wt 78.5 kg (173 lb)  BMI 25.92 kg/m2     LEFT HAND / WRIST EXAM:    The splint was removed.  All incisions (carpal tunnel, volar ganglion and palmar hand) healing well with skin edges well approximated and everted. Dry blood surrounding incisions. Resolving ecchymosis.  Sutures in place.   No erythema. No drainage.    There is mild swelling in the volar wrist, hand, notably at the ganglion site.  There is mild tenderness around all incisions.  There is mild resolving ecchymosis around incisions.  There is no erythema of the surrounding skin.  There is no maceration of the skin.  There is no deformity in the area.    Continued sensation loss over the distribution of the median nerve on the left hand.   Brisk capillary refill to all fingers, warm and well-perfused.   Palpable radial pulse.      Pathology [facility]: hand mass consistent with superficial fibromatosis, wrist mass consistent with ganglion cyst.      ASSESSMENT: 58 year old male, 2 weeks status post left carpal tunnel release, volar ganglion excision and left palmar mass excision, doing well.      PLAN: routine postoperative of carpal tunnel release.    Remove sutures today, soft dressing applied  May wash hands, no aggressive scrubbing for another week  Continue with hand and wrist exercises for " motion.  Scar massage and desensitization discussed and demonstrated.  Gradual return to light use of hands for ADLs. No aggressive hand use for another 4 weeks.  Apply Vitamin E oil over scar tissue at 6 weeks  Letter written for employer regarding work restrictions. No use of left hand at work. Return to light duty on 12/3/2018.  Discussed potential for OT; hold for now  Return to clinic 4 weeks for clinical exam, sooner as needed.  It may take 6 months or longer for symptoms to resolve, and in cases of severe carpal tunnel syndrome, symptoms may not completely improve. Given severity of symptoms and demyelination and loss of motor/sensory axons seen on EMG, may not completely improve as we discussed prior to surgery.    The information in this document, created by the medical scribe for me, accurately reflects the services I personally performed and the decisions made by me. I have reviewed and approved this document for accuracy prior to leaving the patient care area.     Missael Mcdonald M.D., M.S.  Dept. of Orthopaedic Surgery  [facility]

## 2018-12-20 ENCOUNTER — OFFICE VISIT (OUTPATIENT)
Dept: ORTHOPEDICS | Facility: CLINIC | Age: 58
End: 2018-12-20
Payer: COMMERCIAL

## 2018-12-20 VITALS
DIASTOLIC BLOOD PRESSURE: 88 MMHG | HEART RATE: 75 BPM | SYSTOLIC BLOOD PRESSURE: 136 MMHG | WEIGHT: 173 LBS | RESPIRATION RATE: 14 BRPM | HEIGHT: 69 IN | OXYGEN SATURATION: 97 % | BODY MASS INDEX: 25.62 KG/M2

## 2018-12-20 DIAGNOSIS — Z98.890 S/P EXCISION OF GANGLION CYST: ICD-10-CM

## 2018-12-20 DIAGNOSIS — Z98.890 S/P CARPAL TUNNEL RELEASE: Primary | ICD-10-CM

## 2018-12-20 PROCEDURE — 99024 POSTOP FOLLOW-UP VISIT: CPT | Performed by: ORTHOPAEDIC SURGERY

## 2018-12-20 ASSESSMENT — MIFFLIN-ST. JEOR: SCORE: 1592.16

## 2018-12-20 ASSESSMENT — PAIN SCALES - GENERAL: PAINLEVEL: MILD PAIN (2)

## 2018-12-20 NOTE — PROGRESS NOTES
Chief Complaint   Patient presents with     Schedule Surgery      follow-up of a left carpal tunnel release, left volar ganglion excision and left hand palmar mass excision.It has now been 5 weeks since surgery.  Still has numbness in thumb, index and long finger. Has improved about 20% since last appt.        SURGERY: left carpal tunnel release, left wrist volar ganglion excision, left hand palmar mass excision. ( [facility])  DATE OF SURGERY: 11/13/2018      HPI: Bandar Warren is a 58 year old male , right -hand dominant, who presents for post-surgical follow-up of a left carpal tunnel release, left volar ganglion excision and left hand palmar mass excision. It has been about 5 weeks since surgery. He continues to have numbness in the thumb, index and long fingers. He reports about a 20% improvement since last appointment. Feels he has cold hands, both hands. Has returned to work with restrictions.     He states the carpal tunnel syndrome is workman's comp.     Recall his EMG done on 9/6/2018 by [facility].  Impression:  1. Severe left median neuropathy across the wrist (carpal tunnel syndrome), with severe focal motor demyelination and loss of sensory/motor axons   2. There is no evidence of brachial plexopathy or cervical radiculopathy.     OR FINDINGS  lobulated cystic mass from the volar radiocarpal joint filled with thick fluid. Thick transverse carpal ligament. 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        PAST MEDICAL HISTORY:   Past Medical History:   Diagnosis Date     NO ACTIVE PROBLEMS        PAST SURGICAL HISTORY:   Past Surgical History:   Procedure Laterality Date     APPENDECTOMY  1970's     ORTHOPEDIC SURGERY      Knee - ACL and MCL       MEDICATIONS:    No current outpatient medications on file.        ALLERGIES:   Allergies   Allergen Reactions     Poison Ivy Extract [Extract Of  "Poison Ivy]      ROS:   POSITIVE for numbness, tingling, parasthesias.   Denies headaches.   Denies fevers, chills, night sweats   Denies chest pain.   Denies shortness of breath.   Denies any skin problems, abrasions, rashes, irritation.     This document serves as a record of the services and decisions personally performed and made by Missael Mcdonald MD. It was created on his behalf by Elsa Colon, a trained medical scribe. The creation of this document is based the provider's statements to the medical scribe.    Scribe Elsa Colon 2:46 PM 12/20/2018       PHYSICAL EXAM  GENERAL APPEARANCE: healthy, alert, no distress.   SKIN: no suspicious lesions or rashes  RESPIRATORY: No increased work of breathing.  NEURO: Normal strength and tone, mentation intact and speech normal  PSYCH:  mentation appears normal and affect normal. Not anxious.      /88   Pulse 75   Resp 14   Ht 1.74 m (5' 8.5\")   Wt 78.5 kg (173 lb)   SpO2 97%   BMI 25.92 kg/m       LEFT HAND / WRIST EXAM:  Degenerative changes of the fingers, notably the thumb carpometacarpal joint.  Incisions healed well with skin edges well approximated and everted.  No erythema. No drainage.    There is no swelling in the volar wrist, hand  There is no tenderness around all incisions.  There is no ecchymosis  There is no erythema of the surrounding skin.  There is no maceration of the skin.  There is no deformity in the area.    Continued sensation loss over the distribution of the median nerve on the left hand.   Brisk capillary refill to all fingers, warm and well-perfused.   Palpable radial pulse.      Pathology [facility]: hand mass consistent with superficial fibromatosis, wrist mass consistent with ganglion cyst.      ASSESSMENT: 58 year old male, 5 weeks status post left carpal tunnel release, volar ganglion excision and left palmar mass excision, doing well.      PLAN: routine postoperative of carpal tunnel release.  * glad to hear there has " "been some improvement in his carpal tunnel syndrome symptoms. Again, it may take 6 months or longer for symptoms to resolve, and in cases of severe carpal tunnel syndrome, symptoms may not completely improve. Given severity of symptoms and demyelination and loss of motor/sensory axons seen on EMG, may not completely improve as we discussed prior to surgery.  * Continue with hand and wrist exercises for motion. \"squishy\" ball.  * Scar massage and desensitization discussed and demonstrated.  * Activity: no lifting greater than 10 lbs. Otherwise OK to do normal activities of daily living  * Workability update: Ok to return to work with restrictions: OK to lift no more than 10 lb with the left hand. Reassess in 4 weeks.  * Hand therapy referral placed.  * Return to clinic 4 weeks for clinical exam, sooner as needed.      The information in this document, created by the medical scribe for me, accurately reflects the services I personally performed and the decisions made by me. I have reviewed and approved this document for accuracy prior to leaving the patient care area.     Missael Mcdonald M.D., M.S.  Dept. of Orthopaedic Surgery  [facility]  "

## 2018-12-20 NOTE — LETTER
12/20/2018         RE: Bandar Warren  [address]  [place] [address]        Dear Colleague,    Thank you for referring your patient, Bandar Warren, to the [facility]. Please see a copy of my visit note below.    Chief Complaint   Patient presents with     Schedule Surgery      follow-up of a left carpal tunnel release, left volar ganglion excision and left hand palmar mass excision.It has now been 5 weeks since surgery.  Still has numbness in thumb, index and long finger. Has improved about 20% since last appt.        SURGERY: left carpal tunnel release, left wrist volar ganglion excision, left hand palmar mass excision. ( [facility])  DATE OF SURGERY: 11/13/2018      HPI: Bandar Warren is a 58 year old male , right -hand dominant, who presents for post-surgical follow-up of a left carpal tunnel release, left volar ganglion excision and left hand palmar mass excision. It has been about 5 weeks since surgery. He continues to have numbness in the thumb, index and long fingers. He reports about a 20% improvement since last appointment. Feels he has cold hands, both hands. Has returned to work with restrictions.     He states the carpal tunnel syndrome is workman's comp.     Recall his EMG done on 9/6/2018 by [facility].  Impression:  1. Severe left median neuropathy across the wrist (carpal tunnel syndrome), with severe focal motor demyelination and loss of sensory/motor axons   2. There is no evidence of brachial plexopathy or cervical radiculopathy.     OR FINDINGS  lobulated cystic mass from the volar radiocarpal joint filled with thick fluid. Thick transverse carpal ligament. 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        PAST MEDICAL HISTORY:   Past Medical History:   Diagnosis Date     NO ACTIVE PROBLEMS        PAST SURGICAL HISTORY:   Past Surgical  "History:   Procedure Laterality Date     APPENDECTOMY  1970's     ORTHOPEDIC SURGERY      Knee - ACL and MCL       MEDICATIONS:    No current outpatient medications on file.        ALLERGIES:   Allergies   Allergen Reactions     Poison Ivy Extract [Extract Of Poison Ivy]      ROS:   POSITIVE for numbness, tingling, parasthesias.   Denies headaches.   Denies fevers, chills, night sweats   Denies chest pain.   Denies shortness of breath.   Denies any skin problems, abrasions, rashes, irritation.     This document serves as a record of the services and decisions personally performed and made by Missael Mcdonald MD. It was created on his behalf by Elsa Colon, a trained medical scribe. The creation of this document is based the provider's statements to the medical scribe.    Scribe Elsa Colon 2:46 PM 12/20/2018       PHYSICAL EXAM  GENERAL APPEARANCE: healthy, alert, no distress.   SKIN: no suspicious lesions or rashes  RESPIRATORY: No increased work of breathing.  NEURO: Normal strength and tone, mentation intact and speech normal  PSYCH:  mentation appears normal and affect normal. Not anxious.      /88   Pulse 75   Resp 14   Ht 1.74 m (5' 8.5\")   Wt 78.5 kg (173 lb)   SpO2 97%   BMI 25.92 kg/m        LEFT HAND / WRIST EXAM:  Degenerative changes of the fingers, notably the thumb carpometacarpal joint.  Incisions healed well with skin edges well approximated and everted.  No erythema. No drainage.    There is no swelling in the volar wrist, hand  There is no tenderness around all incisions.  There is no ecchymosis  There is no erythema of the surrounding skin.  There is no maceration of the skin.  There is no deformity in the area.    Continued sensation loss over the distribution of the median nerve on the left hand.   Brisk capillary refill to all fingers, warm and well-perfused.   Palpable radial pulse.      Pathology [facility]: hand mass consistent with superficial fibromatosis, wrist mass " "consistent with ganglion cyst.      ASSESSMENT: 58 year old male, 5 weeks status post left carpal tunnel release, volar ganglion excision and left palmar mass excision, doing well.      PLAN: routine postoperative of carpal tunnel release.  * glad to hear there has been some improvement in his carpal tunnel syndrome symptoms. Again, it may take 6 months or longer for symptoms to resolve, and in cases of severe carpal tunnel syndrome, symptoms may not completely improve. Given severity of symptoms and demyelination and loss of motor/sensory axons seen on EMG, may not completely improve as we discussed prior to surgery.  * Continue with hand and wrist exercises for motion. \"squishy\" ball.  * Scar massage and desensitization discussed and demonstrated.  * Activity: no lifting greater than 10 lbs. Otherwise OK to do normal activities of daily living  * Workability update: Ok to return to work with restrictions: OK to lift no more than 10 lb with the left hand. Reassess in 4 weeks.  * Hand therapy referral placed.  * Return to clinic 4 weeks for clinical exam, sooner as needed.      The information in this document, created by the medical scribe for me, accurately reflects the services I personally performed and the decisions made by me. I have reviewed and approved this document for accuracy prior to leaving the patient care area.     Missael Mcdonald M.D., M.S.  Dept. of Orthopaedic Surgery  [facility]    Again, thank you for allowing me to participate in the care of your patient.        Sincerely,        Missael Mcdonald MD    "

## 2018-12-20 NOTE — LETTER
[facility]  [address]  [place] [address]  Phone: [phone]  Fax: [phone]    December 20, 2018        RE: Bandar Warren     [address]  [place] [address]           To whom it may concern:      Bandar Warren is under my professional care for   1. S/P carpal tunnel release    2.     Ganglion cyst excision  3.      Hand mass excision     Date of Surgery: 11/13/18.     Mr. Warren can return to work on 12/21/18 with restrictions: 10lb lifting restriction with left upper extremity. No repetitive use of left upper extremity. Hand must be kept clean and dry at all times.  DURATION OF LIMITATIONS: 4 weeks        Next appointment: 1 month           James Rosas PA-C, CAQ (Ortho)  Supervising Physician: Missael Mcdonald M.D., M.S.  Dept. of Orthopaedic Surgery  [facility]

## 2019-01-07 ENCOUNTER — THERAPY VISIT (OUTPATIENT)
Dept: OCCUPATIONAL THERAPY | Facility: CLINIC | Age: 59
End: 2019-01-07
Payer: OTHER MISCELLANEOUS

## 2019-01-07 DIAGNOSIS — Z98.890 S/P CARPAL TUNNEL RELEASE: ICD-10-CM

## 2019-01-07 DIAGNOSIS — G56.02 CARPAL TUNNEL SYNDROME OF LEFT WRIST: ICD-10-CM

## 2019-01-07 DIAGNOSIS — M79.642 PAIN OF LEFT HAND: Primary | ICD-10-CM

## 2019-01-07 DIAGNOSIS — Z98.890 S/P EXCISION OF GANGLION CYST: ICD-10-CM

## 2019-01-07 PROCEDURE — 97035 APP MDLTY 1+ULTRASOUND EA 15: CPT | Mod: GO | Performed by: OCCUPATIONAL THERAPIST

## 2019-01-07 PROCEDURE — 97110 THERAPEUTIC EXERCISES: CPT | Mod: GO | Performed by: OCCUPATIONAL THERAPIST

## 2019-01-07 PROCEDURE — 97140 MANUAL THERAPY 1/> REGIONS: CPT | Mod: GO | Performed by: OCCUPATIONAL THERAPIST

## 2019-01-07 PROCEDURE — 97165 OT EVAL LOW COMPLEX 30 MIN: CPT | Mod: GO | Performed by: OCCUPATIONAL THERAPIST

## 2019-01-07 NOTE — PROGRESS NOTES
Hand Therapy Initial Evaluation    Current Date:  1/7/2019    Subjective:  Bandar Warren is a 58 yeatr old right hand dominant male.    Diagnosis:   Left CTS and volar wrist ganglion  DOS:  11/13/18  Procedure:  CTR and ganglion excision   Post:  7w 6d    Patient reports symptoms of pain, stiffness/loss of motion, weakness/loss of strength, numbness and tingling  of the left wrist which occurred due to gradual onset over the past 5 years. Since onset symptoms are gradually getting worse 6 months.  Special tests:  EMG prior to surgery severe CTS.  Previous treatment: none.  General health as reported by patient is good.  Pertinent medical history includes:Concussions/Dizziness, Numbness/Tingling, Severe Dizziness  Medical allergies:none.  Surgical history: orthopedic: left knee, and left CTR.  Medication history: None.    Occupational Profile Information:  Current occupation is [employer]  Currently working in normal job with restrictions  Job Tasks: Lifting, Carrying, Pushing, Pulling, Repetitive Tasks  Prior functional level:  no limitations  Barriers include:none  Mobility: No difficulty  Transportation: drives    Functional Outcome Measure:  Upper Extremity Functional Index  SCORE:   Column Totals: 68/80  (A lower score indicates greater disability.)    Objective:  ROM:  Wrist  1/7/19   AROM(PROM) Right Left   Extension 60 60   Flexion 80 55   RD 10 10   UD 45 40     Strength:   (Measured in pounds)    1/7/19   Trials Right Left   1  2  3 75  67  70 25+  20++  18++   Average: 71 21     Lat Pinch  1/7/19   Trials Right Left   1  2  3 19  19  19 15-  14-  14-   Average: 19 14     3 Pt Pinch  1/7/19   Trials Right Left   1  2  3 20  20  21 14+  12+  12+   Average: 20 13     Edema:  Mild volar wrist    Scar:  Sensitivity: Moderate at CT, none volar wrist/ganglion incision Quality:  Moderately adherent CT scar, minimal volar wrist/ganlion    Sensation:  Decreased Median Nerve distribution but improved since surgery  per patient report    Pain Report:  VAS(0-10) 1/7/19   At Rest: 0/10   With Use: 3/10   Location:  Palm/CT scar  Pain Quality:  sharp  Frequency: intermittent    Pain is worst:  daytime  Exacerbated by:  Pressure to palm, gripping, lifting  Relieved by:  rest  Progression:  Gradually improving  Assessment:  Patient presents with symptoms consistent with diagnosis of Carpal Tunnel Syndrome and volar wrist ganglion, with surgical intervention.   Patient's limitations or Problem List includes:  Pain, Decreased ROM/motion, Increased edema, Weakness, Sensory disturbance, Adherent scarring, Decreased  and Decreased pinch of the left wrist and hand which interferes with the patient's ability to perform Self Care Tasks (dressing), Work Tasks, Recreational Activities and Household Chores as compared to previous level of function.    Rehab Potential:  Good - Return to full activity, some limitations    Patient will benefit from skilled Occupational Therapy to increase ROM, flexibility, overall strength,  strength, pinch strength and sensation and decrease pain, edema and adherence of scarring to return to previous activity level and resume normal daily tasks and to reach their rehab potential.    Barriers to Learning:  No barrier    Communication Issues:  Patient appears to be able to clearly communicate and understand verbal and written communication and follow directions correctly.    Chart Review: Chart Review and Simple history review with patient    Identified Performance Deficits: dressing, home establishment and management, work and leisure activities    Assessment of Occupational Performance:  5 or more Performance Deficits    Clinical Decision Making (Complexity): Low complexity    Treatment Explanation:  The following has been discussed with the patient:    RX ordered/plan of care  Anticipated outcomes  Possible risks and side effects    Plan:  Frequency:  1 X week, once daily  Duration:  for 6  visits  Treatment Plan:  Modalities:  US, Fluidotherapy or Paraffin  Therapeutic Exercise:  AROM, Tendon Gliding and /Pinch Strengthening  Neuromuscular re-education:  Nerve Gliding, Desensitization and Kinesiotaping  Manual Techniques:  Scar mobilization  Self Care: Diagnostic education    Avoid activities that exacerbate median nerve symptoms    Avoid prolonged flexion or extension of the wrist    Discharge Plan:    Achieve all LTG.  Independent in home treatment program.  Reach maximal therapeutic benefit.    Home Exercise Program:  Warmth for stiffness  Scar mobs, circular and 3 vector  AROM wrist E/F and R/U  Gentle PROM wrist flexion, avoid nerve symptoms   Tendon gliding with 3 fists and FDS  Distal median nerve gliding   and 3 point pinch strengthening     Next Visit:  See in 1 week  Assess response to HEP  Continue US for scar softening  Add scar pad sleeping

## 2019-01-17 ENCOUNTER — THERAPY VISIT (OUTPATIENT)
Dept: OCCUPATIONAL THERAPY | Facility: CLINIC | Age: 59
End: 2019-01-17
Payer: OTHER MISCELLANEOUS

## 2019-01-17 DIAGNOSIS — G56.02 CARPAL TUNNEL SYNDROME OF LEFT WRIST: ICD-10-CM

## 2019-01-17 DIAGNOSIS — M79.642 PAIN OF LEFT HAND: ICD-10-CM

## 2019-01-17 PROCEDURE — 97110 THERAPEUTIC EXERCISES: CPT | Mod: GO | Performed by: OCCUPATIONAL THERAPIST

## 2019-01-17 PROCEDURE — 97035 APP MDLTY 1+ULTRASOUND EA 15: CPT | Mod: GO | Performed by: OCCUPATIONAL THERAPIST

## 2019-01-17 PROCEDURE — 97140 MANUAL THERAPY 1/> REGIONS: CPT | Mod: GO | Performed by: OCCUPATIONAL THERAPIST

## 2019-01-17 NOTE — PROGRESS NOTES
SOAP note objective information for 1/17/2019:    ROM:  Wrist  1/7/19 1/17/19   AROM(PROM) Right Left Left   Extension 60 60    Flexion 80 55 65   RD 10 10    UD 45 40      Strength:   (Measured in pounds)    1/7/19 1/17/19   Trials Right Left Left   1  2  3 75  67  70 25+  20++  18++ 30  28  26   Average: 71 21 26     Home Exercise Program:  Warmth for stiffness  Scar mobs, circular and 3 vector  Scar pads with tubigrip sleeve sleeping  AROM wrist E/F and R/U  Gentle PROM wrist flexion, avoid nerve symptoms   Tendon gliding with 3 fists and FDS  Distal median nerve gliding   and 3 point pinch strengthening     Next Visit:  See in 1 week  Continue US for scar softening  Assess response to scar padd sleeping     Please refer to the daily flowsheet for treatment today, total treatment time and time spent performing 1:1 timed codes.

## 2019-01-21 ENCOUNTER — OFFICE VISIT (OUTPATIENT)
Dept: ORTHOPEDICS | Facility: CLINIC | Age: 59
End: 2019-01-21
Payer: OTHER MISCELLANEOUS

## 2019-01-21 VITALS
HEART RATE: 81 BPM | SYSTOLIC BLOOD PRESSURE: 138 MMHG | BODY MASS INDEX: 25.92 KG/M2 | OXYGEN SATURATION: 97 % | DIASTOLIC BLOOD PRESSURE: 92 MMHG | RESPIRATION RATE: 14 BRPM | HEIGHT: 69 IN

## 2019-01-21 DIAGNOSIS — Z98.890 S/P CARPAL TUNNEL RELEASE: Primary | ICD-10-CM

## 2019-01-21 DIAGNOSIS — Z98.890 S/P EXCISION OF GANGLION CYST: ICD-10-CM

## 2019-01-21 PROCEDURE — 99024 POSTOP FOLLOW-UP VISIT: CPT | Performed by: ORTHOPAEDIC SURGERY

## 2019-01-21 ASSESSMENT — PAIN SCALES - GENERAL: PAINLEVEL: NO PAIN (1)

## 2019-01-21 NOTE — LETTER
[facility]  [address]  [place] [address]  Phone: [phone]  Fax: [phone]    January 21, 2019        Bandar Warren  [address]  [place] [address]           To whom it may concern:      Bandar Warren is under my professional care for   1. S/P carpal tunnel release    2.     Ganglion cyst excision  3.      Hand mass excision     Date of Surgery: 11/13/18.     Mr. Warren can return to work on 12/21/18 with restrictions: 20lb lifting restriction with left upper extremity. No repetitive use of left upper extremity and avoid taping.Hand must be kept clean and dry at all times.  DURATION OF LIMITATIONS: 4 weeks        Next appointment: 4 weeks           James Rosas PA-C, CAQ (Ortho)  Supervising Physician: Missael Mcdonald M.D., M.S.  Dept. of Orthopaedic Surgery  [facility]

## 2019-01-21 NOTE — LETTER
[facility]  [address]  [place] [address]  Phone: [phone]  Fax: [phone]    January 21, 2019        Bandar Warren  [address]  [place] [address]        To whom it may concern:      Bandar Warren is under my professional care for   1. S/P carpal tunnel release    2.     Ganglion cyst excision  3.      Hand mass excision     Date of Surgery: 11/13/18.     Mr. Warren can return to work on 1/21/19 with restrictions: 20lb lifting restriction with left upper extremity. No repetitive use of left upper extremity and avoid taping.Hand must be kept clean and dry at all times.  DURATION OF LIMITATIONS: 4 weeks        Next appointment: 4 weeks           James Rosas PA-C, CAQ (Ortho)  Supervising Physician: Missael Mcdonald M.D., M.S.  Dept. of Orthopaedic Surgery  [facility]

## 2019-01-21 NOTE — PROGRESS NOTES
Chief Complaint   Patient presents with     Schedule Surgery     follow-up of a left carpal tunnel release, left volar ganglion excision and left hand palmar mass excision.DOS: 11/13/2018 10 wks PO. He continues to have weakness with gripping things       SURGERY: left carpal tunnel release, left wrist volar ganglion excision, left hand palmar mass excision. ( [facility])  DATE OF SURGERY: 11/13/2018      HPI: Bandar Warren is a 58 year old male , right -hand dominant, who presents for post-surgical follow-up of a left carpal tunnel release, left volar ganglion excision and left hand palmar mass excision. It has been about 10 weeks since surgery. returns today overall doing well. His numbness and tingling has been improving but not quite the sensitivity as his right hand. He has improvements from preop.  He's going to hand therapy with improvements in strength, but knows its still weak. Has returned to work with restrictions.  He lifted up a 12ft step ladder the other day, felt a little soreness in the palm. He's been lifting 1gallon buckets at work without problems but doesn't feel like he could lift 5lbs.    He states the carpal tunnel syndrome is workman's comp. Works as a .     Recall his EMG done on 9/6/2018 by [facility].  Impression:  1. Severe left median neuropathy across the wrist (carpal tunnel syndrome), with severe focal motor demyelination and loss of sensory/motor axons   2. There is no evidence of brachial plexopathy or cervical radiculopathy.     OR FINDINGS  lobulated cystic mass from the volar radiocarpal joint filled with thick fluid. Thick transverse carpal ligament. 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        PAST MEDICAL HISTORY:   Past Medical History:   Diagnosis Date     NO ACTIVE PROBLEMS        PAST SURGICAL HISTORY:   Past Surgical History:   Procedure  "Laterality Date     APPENDECTOMY  1970's     ORTHOPEDIC SURGERY      Knee - ACL and MCL       MEDICATIONS:    No current outpatient medications on file.        ALLERGIES:   Allergies   Allergen Reactions     Poison Ivy Extract [Extract Of Poison Ivy]      ROS:   POSITIVE for numbness, tingling, parasthesias.   Denies headaches.   Denies fevers, chills, night sweats   Denies chest pain.   Denies shortness of breath.   Denies any skin problems, abrasions, rashes, irritation.       PHYSICAL EXAM  GENERAL APPEARANCE: healthy, alert, no distress.   SKIN: no suspicious lesions or rashes  RESPIRATORY: No increased work of breathing.  NEURO: Normal strength and tone, mentation intact and speech normal  PSYCH:  mentation appears normal and affect normal. Not anxious.      BP (!) 138/92   Pulse 81   Resp 14   Ht 1.74 m (5' 8.5\")   SpO2 97%   BMI 25.92 kg/m       LEFT HAND / WRIST EXAM:  Degenerative changes of the fingers, notably the thumb carpometacarpal joint.  Incisions healed well with skin edges well approximated and everted.  No erythema. No drainage.    There is no swelling in the volar wrist, hand  There is no tenderness around all incisions.  There is no ecchymosis  There is no erythema of the surrounding skin.  There is no maceration of the skin.  There is no deformity in the area.    Continued decreased sensation over the distribution of the median nerve on the left hand but intact.   Brisk capillary refill to all fingers, warm and well-perfused.   Palpable radial pulse.      Pathology [facility]: hand mass consistent with superficial fibromatosis, wrist mass consistent with ganglion cyst.      ASSESSMENT: 58 year old male, 10 weeks status post left carpal tunnel release, volar ganglion excision and left palmar mass excision, doing well.      PLAN: routine postoperative of carpal tunnel release.  * glad to hear there has been further improvement in his carpal tunnel syndrome symptoms. Again, it may " "take 6 months or longer for symptoms to resolve, and in cases of severe carpal tunnel syndrome, symptoms may not completely improve. Given severity of symptoms and demyelination and loss of motor/sensory axons seen on EMG, may not completely improve as we discussed prior to surgery.  * Continue with hand and wrist exercises for motion. \"squishy\" ball.  * Scar massage and desensitization   * Activity: no lifting greater than 20 lbs. No taping. Otherwise OK to do normal activities of daily living  * Workability update: Ok to return to work with restrictions: OK to lift no more than 20 lb with the left hand. No taping. Reassess in 4 weeks.  * Return to clinic 4 weeks for clinical exam, sooner as needed.    Patient to follow up with Primary Care provider regarding elevated blood pressure.    Patient declines BP recheck stating he doesn't have a problem.      Missael Mcdonald M.D., M.S.  Dept. of Orthopaedic Surgery  [facility]  "

## 2019-01-21 NOTE — LETTER
1/21/2019         RE: Badnar Warren  [address]  [place] [address]        Dear Colleague,    Thank you for referring your patient, Bandar Warren, to the [facility]. Please see a copy of my visit note below.    Chief Complaint   Patient presents with     Schedule Surgery     follow-up of a left carpal tunnel release, left volar ganglion excision and left hand palmar mass excision.DOS: 11/13/2018 10 wks PO. He continues to have weakness with gripping things       SURGERY: left carpal tunnel release, left wrist volar ganglion excision, left hand palmar mass excision. ( [facility])  DATE OF SURGERY: 11/13/2018      HPI: Bandar Warren is a 58 year old male , right -hand dominant, who presents for post-surgical follow-up of a left carpal tunnel release, left volar ganglion excision and left hand palmar mass excision. It has been about 10 weeks since surgery. returns today overall doing well. His numbness and tingling has been improving but not quite the sensitivity as his right hand. He has improvements from preop.  He's going to hand therapy with improvements in strength, but knows its still weak. Has returned to work with restrictions.  He lifted up a 12ft step ladder the other day, felt a little soreness in the palm. He's been lifting 1gallon buckets at work without problems but doesn't feel like he could lift 5lbs.    He states the carpal tunnel syndrome is workman's comp. Works as a .     Recall his EMG done on 9/6/2018 by [facility].  Impression:  1. Severe left median neuropathy across the wrist (carpal tunnel syndrome), with severe focal motor demyelination and loss of sensory/motor axons   2. There is no evidence of brachial plexopathy or cervical radiculopathy.     OR FINDINGS  lobulated cystic mass from the volar radiocarpal joint filled with thick fluid. Thick transverse carpal ligament. Fibrous cutaneous mass of the palm of the  "left hand with appearance of a Dupuytrens pit without cord or contracture or foreign body.        PAST MEDICAL HISTORY:   Past Medical History:   Diagnosis Date     NO ACTIVE PROBLEMS        PAST SURGICAL HISTORY:   Past Surgical History:   Procedure Laterality Date     APPENDECTOMY  1970's     ORTHOPEDIC SURGERY      Knee - ACL and MCL       MEDICATIONS:    No current outpatient medications on file.        ALLERGIES:   Allergies   Allergen Reactions     Poison Ivy Extract [Extract Of Poison Ivy]      ROS:   POSITIVE for numbness, tingling, parasthesias.   Denies headaches.   Denies fevers, chills, night sweats   Denies chest pain.   Denies shortness of breath.   Denies any skin problems, abrasions, rashes, irritation.       PHYSICAL EXAM  GENERAL APPEARANCE: healthy, alert, no distress.   SKIN: no suspicious lesions or rashes  RESPIRATORY: No increased work of breathing.  NEURO: Normal strength and tone, mentation intact and speech normal  PSYCH:  mentation appears normal and affect normal. Not anxious.      BP (!) 138/92   Pulse 81   Resp 14   Ht 1.74 m (5' 8.5\")   SpO2 97%   BMI 25.92 kg/m        LEFT HAND / WRIST EXAM:  Degenerative changes of the fingers, notably the thumb carpometacarpal joint.  Incisions healed well with skin edges well approximated and everted.  No erythema. No drainage.    There is no swelling in the volar wrist, hand  There is no tenderness around all incisions.  There is no ecchymosis  There is no erythema of the surrounding skin.  There is no maceration of the skin.  There is no deformity in the area.    Continued decreased sensation over the distribution of the median nerve on the left hand but intact.   Brisk capillary refill to all fingers, warm and well-perfused.   Palpable radial pulse.      Pathology [facility]: hand mass consistent with superficial fibromatosis, wrist mass consistent with ganglion cyst.      ASSESSMENT: 58 year old male, 10 weeks status post left " "carpal tunnel release, volar ganglion excision and left palmar mass excision, doing well.      PLAN: routine postoperative of carpal tunnel release.  * glad to hear there has been further improvement in his carpal tunnel syndrome symptoms. Again, it may take 6 months or longer for symptoms to resolve, and in cases of severe carpal tunnel syndrome, symptoms may not completely improve. Given severity of symptoms and demyelination and loss of motor/sensory axons seen on EMG, may not completely improve as we discussed prior to surgery.  * Continue with hand and wrist exercises for motion. \"squishy\" ball.  * Scar massage and desensitization   * Activity: no lifting greater than 20 lbs. No taping. Otherwise OK to do normal activities of daily living  * Workability update: Ok to return to work with restrictions: OK to lift no more than 20 lb with the left hand. No taping. Reassess in 4 weeks.  * Return to clinic 4 weeks for clinical exam, sooner as needed.    Patient to follow up with Primary Care provider regarding elevated blood pressure.    Patient declines BP recheck stating he doesn't have a problem.      Missael Mcdonald M.D., M.S.  Dept. of Orthopaedic Surgery  [facility]    Again, thank you for allowing me to participate in the care of your patient.        Sincerely,        Missael Mcdonald MD    "

## 2019-01-23 ENCOUNTER — THERAPY VISIT (OUTPATIENT)
Dept: OCCUPATIONAL THERAPY | Facility: CLINIC | Age: 59
End: 2019-01-23
Payer: OTHER MISCELLANEOUS

## 2019-01-23 DIAGNOSIS — G56.02 CARPAL TUNNEL SYNDROME OF LEFT WRIST: ICD-10-CM

## 2019-01-23 DIAGNOSIS — M79.642 PAIN OF LEFT HAND: ICD-10-CM

## 2019-01-23 PROCEDURE — 97110 THERAPEUTIC EXERCISES: CPT | Mod: GO | Performed by: OCCUPATIONAL THERAPIST

## 2019-01-23 PROCEDURE — 97140 MANUAL THERAPY 1/> REGIONS: CPT | Mod: GO | Performed by: OCCUPATIONAL THERAPIST

## 2019-01-23 PROCEDURE — 97035 APP MDLTY 1+ULTRASOUND EA 15: CPT | Mod: GO | Performed by: OCCUPATIONAL THERAPIST

## 2019-01-23 NOTE — PROGRESS NOTES
"SOAP note objective information for 1/23/2019:    ROM:  Wrist  1/7/19 1/17/19 1/23/19   AROM(PROM) Right Left Left left   Extension 60 60     Flexion 80 55 65 65   RD 10 10     UD 45 40       Strength:   (Measured in pounds)    1/7/19 1/17/19 1/23/19   Trials Right Left Left Left   1  2  3 75  67  70 25+  20++  18++ 30  28  26 35  34  38   Average: 71 21 26 36     Home Exercise Program:  Warmth for stiffness  Scar mobs, circular and 3 vector  Scar pads with tubigrip sleeve sleeping  AROM wrist E/F and R/U  Gentle PROM wrist flexion, avoid nerve symptoms   Tendon gliding with 3 fists and FDS  Distal median nerve gliding   strengthening   Thumb stabilization with hard \"C\" and isometric 1st DI    Next Visit:  Continue US for scar softening 1x week up to 3 more sessions  Assess response to thumb stabilization     Please refer to the daily flowsheet for treatment today, total treatment time and time spent performing 1:1 timed codes.   "

## 2019-02-04 ENCOUNTER — THERAPY VISIT (OUTPATIENT)
Dept: OCCUPATIONAL THERAPY | Facility: CLINIC | Age: 59
End: 2019-02-04
Payer: OTHER MISCELLANEOUS

## 2019-02-04 DIAGNOSIS — M79.642 PAIN OF LEFT HAND: ICD-10-CM

## 2019-02-04 DIAGNOSIS — G56.02 CARPAL TUNNEL SYNDROME OF LEFT WRIST: ICD-10-CM

## 2019-02-04 PROCEDURE — 97110 THERAPEUTIC EXERCISES: CPT | Mod: GO | Performed by: OCCUPATIONAL THERAPIST

## 2019-02-04 PROCEDURE — 97035 APP MDLTY 1+ULTRASOUND EA 15: CPT | Mod: GO | Performed by: OCCUPATIONAL THERAPIST

## 2019-02-04 PROCEDURE — 97140 MANUAL THERAPY 1/> REGIONS: CPT | Mod: GO | Performed by: OCCUPATIONAL THERAPIST

## 2019-02-04 NOTE — PROGRESS NOTES
"SOAP note objective information for 2/4/2019:    ROM:  Wrist  1/7/19 1/17/19 1/23/19 2/4/19   AROM(PROM) Right Left Left Left Left   Extension 60 60      Flexion 80 55 65 65 70   RD 10 10      UD 45 40        Strength:   (Measured in pounds)    1/7/19 1/17/19 1/23/19 2/4/19   Trials Right Left Left Left Left   1  2  3 75  67  70 25+  20++  18++ 30  28  26 35  34  38 35  40  28   Average: 71 21 26 36 34       Lat Pinch  1/7/19 2/4/19   Trials Right Left Left   1  2  3 19  19  19 15-  14-  14- 16  15  14   Average: 19 14 15     3 Pt Pinch  1/7/19 2/4/19   Trials Right Left Left   1  2  3 20  20  21 14+  12+  12+ 18  17  16   Average: 20 13 17     Home Exercise Program:  Warmth for stiffness  Scar mobs, circular and 3 vector  Scar pads with tubigrip sleeve sleeping  AROM wrist E/F and R/U  Gentle PROM wrist flexion, avoid nerve symptoms   Tendon gliding with 3 fists and FDS  Distal median nerve gliding   strengthening   Thumb stabilization with isometric hard \"C\" and isotonic 1st DI    Next Visit:  Continue US for scar softening 1-2 more sessions  Progress Report and UEFI, consider discharge to HEP  MD f/u 2/21/19     Please refer to the daily flowsheet for treatment today, total treatment time and time spent performing 1:1 timed codes.   "

## 2019-02-18 ENCOUNTER — THERAPY VISIT (OUTPATIENT)
Dept: OCCUPATIONAL THERAPY | Facility: CLINIC | Age: 59
End: 2019-02-18
Payer: OTHER MISCELLANEOUS

## 2019-02-18 DIAGNOSIS — G56.02 CARPAL TUNNEL SYNDROME OF LEFT WRIST: ICD-10-CM

## 2019-02-18 DIAGNOSIS — M79.642 PAIN OF LEFT HAND: Primary | ICD-10-CM

## 2019-02-18 PROCEDURE — 97140 MANUAL THERAPY 1/> REGIONS: CPT | Mod: GO | Performed by: OCCUPATIONAL THERAPIST

## 2019-02-18 PROCEDURE — 97035 APP MDLTY 1+ULTRASOUND EA 15: CPT | Mod: GO | Performed by: OCCUPATIONAL THERAPIST

## 2019-02-18 PROCEDURE — 97110 THERAPEUTIC EXERCISES: CPT | Mod: GO | Performed by: OCCUPATIONAL THERAPIST

## 2019-02-18 NOTE — PROGRESS NOTES
Hand Therapy Progress/Discharge Note    Current Date:  2/18/2019    Reporting period is 1/7/2019 to 2/18/2019    Diagnosis:   Left CTS and volar wrist ganglion  DOS:  11/13/18  Procedure:  CTR and ganglion excision     Subjective:   Subjective changes noted by patient:  Feeling is pretty good in the fingers but I still get pain at times.  Functional changes noted by patient:  Improvement in Work Tasks  Patient has noted adverse reaction to:  None    Functional Outcome Measure:  Upper Extremity Functional Index  SCORE:   Column Totals: 78/80  (A lower score indicates greater disability.)    Objective:  ROM:  Wrist  1/7/19 2/18/19   AROM(PROM) Right Left Left   Extension 60 60 70   Flexion 80 55 65   RD 10 10 15   UD 45 40 45     Strength:   (Measured in pounds)    1/7/19 2/18/19   Trials Right Left Left   1  2  3 75  67  70 25+  20++  18++ 42-  45-  48-   Average: 71 21 45     Lat Pinch  1/7/19 2/18/19   Trials Right Left Left   1  2  3 19  19  19 15-  14-  14- 17-  19-  18-   Average: 19 14 18     3 Pt Pinch  1/7/19 2/18/19   Trials Right Left Left   1  2  3 20  20  21 14+  12+  12+ 17-  17-  18-   Average: 20 13 17     Edema:  Minimal    Scar:  Sensitivity: Minimal at CT, none volar wrist/ganglion incision Quality:  Mildly adherent CT scar, minimal volar wrist/ganlion    Sensation:  Continues to be improving in Median Nerve distribution since surgery per patient report    Pain Report:  VAS(0-10) 1/7/19 2/18/19   At Rest: 0/10    With Use: 3/10 0-4/10   Location:  Palm/CT scar    Please refer to the daily flowsheet for treatment provided today.     Assessment:  Response to therapy has been improvement to:  ROM of Wrist:  All Planes  Strength:   and pinch  Pain:  intensity of pain is decreased  Paresthesias:  Median nerve - less intense numbness and tingling    Overall Assessment:  Patient's symptoms are resolving and patient is independent in home exercise program  STG/LTG:  STGoals have been reviewed and  "progress or achievement has occurred;  see goal sheet for details and updates.  I have re-evaluated this patient and find that the nature, scope, duration and intensity of the therapy is appropriate for the medical condition of the patient.    Plan:  Frequency/Duration:  Discharge from Hand Therapy; continue home program.    Recommendations for Continued Therapy  Home Exercise Program:  Warmth for stiffness  Scar mobs, circular and 3 vector  Scar pads with tubigrip sleeve sleeping  AROM wrist E/F and R/U  Gentle PROM wrist flexion, avoid nerve symptoms   Tendon gliding with 3 fists and FDS  Distal median nerve gliding   strengthening   Thumb stabilization with isometric hard \"C\" and isotonic 1st DI  "

## 2019-02-21 ENCOUNTER — OFFICE VISIT (OUTPATIENT)
Dept: ORTHOPEDICS | Facility: CLINIC | Age: 59
End: 2019-02-21
Payer: OTHER MISCELLANEOUS

## 2019-02-21 VITALS
RESPIRATION RATE: 16 BRPM | HEART RATE: 77 BPM | OXYGEN SATURATION: 98 % | BODY MASS INDEX: 26.31 KG/M2 | SYSTOLIC BLOOD PRESSURE: 121 MMHG | WEIGHT: 177.6 LBS | HEIGHT: 69 IN | DIASTOLIC BLOOD PRESSURE: 84 MMHG

## 2019-02-21 DIAGNOSIS — Z98.890 S/P CARPAL TUNNEL RELEASE: Primary | ICD-10-CM

## 2019-02-21 DIAGNOSIS — M67.432 GANGLION CYST OF VOLAR ASPECT OF LEFT WRIST: ICD-10-CM

## 2019-02-21 PROCEDURE — 99213 OFFICE O/P EST LOW 20 MIN: CPT | Performed by: ORTHOPAEDIC SURGERY

## 2019-02-21 ASSESSMENT — PAIN SCALES - GENERAL: PAINLEVEL: NO PAIN (0)

## 2019-02-21 ASSESSMENT — MIFFLIN-ST. JEOR: SCORE: 1608.03

## 2019-02-21 NOTE — LETTER
2/21/2019         RE: Bandar Warren  [address]  [place] [address]        Dear Colleague,    Thank you for referring your patient, Bandar Warren, to the [facility]. Please see a copy of my visit note below.    Chief Complaint   Patient presents with     Left Hand - Surgical Followup     Carpal tunnel release, volar ganglion excision, and palmar mass excision. DOS 11/13/18, 14 wk PO. Patient states his hand/wrist are doing good. Still have some numbness but a lot better than what it was.        SURGERY: left carpal tunnel release, left wrist volar ganglion excision, left hand palmar mass excision. ( [facility])  DATE OF SURGERY: 11/13/2018      HPI: Bandar Warren is a 59 year old male , right -hand dominant, who presents for post-surgical follow-up of a left carpal tunnel release, left volar ganglion excision and left hand palmar mass excision. It has been about 14 weeks since surgery. returns today overall doing well. His numbness and tingling has been improving quite a bit since last visit. He has improvements from preop.  He's going to hand therapy with improvements in strength, but knows its still weak. Has returned to work with restrictions. He feels ready to return to work.    He states the carpal tunnel syndrome is workman's comp. Works as a .     Recall his EMG done on 9/6/2018 by [facility].  Impression:  1. Severe left median neuropathy across the wrist (carpal tunnel syndrome), with severe focal motor demyelination and loss of sensory/motor axons   2. There is no evidence of brachial plexopathy or cervical radiculopathy.     OR FINDINGS  lobulated cystic mass from the volar radiocarpal joint filled with thick fluid. Thick transverse carpal ligament. 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        PAST MEDICAL HISTORY:   Past Medical  "History:   Diagnosis Date     NO ACTIVE PROBLEMS        PAST SURGICAL HISTORY:   Past Surgical History:   Procedure Laterality Date     APPENDECTOMY  1970's     ORTHOPEDIC SURGERY      Knee - ACL and MCL       MEDICATIONS:    No current outpatient medications on file.        ALLERGIES:   Allergies   Allergen Reactions     Poison Ivy Extract [Extract Of Poison Ivy]      ROS:   POSITIVE for numbness, tingling, parasthesias.   Denies headaches.   Denies fevers, chills, night sweats   Denies chest pain.   Denies shortness of breath.   Denies any skin problems, abrasions, rashes, irritation.       PHYSICAL EXAM  GENERAL APPEARANCE: healthy, alert, no distress.   SKIN: no suspicious lesions or rashes  RESPIRATORY: No increased work of breathing.  NEURO: Normal strength and tone, mentation intact and speech normal  PSYCH:  mentation appears normal and affect normal. Not anxious.      /84   Pulse 77   Resp 16   Ht 1.74 m (5' 8.5\")   Wt 80.6 kg (177 lb 9.6 oz)   SpO2 98%   BMI 26.61 kg/m        LEFT HAND / WRIST EXAM:  Degenerative changes of the fingers, notably the thumb carpometacarpal joint.  Incisions healed well with skin edges well approximated and everted.  No erythema. No drainage.    There is no swelling in the volar wrist, hand  There is no tenderness around all incisions.  There is no ecchymosis  There is no erythema of the surrounding skin.  There is no maceration of the skin.  There is no deformity in the area.    Continued decreased sensation over the distribution of the median nerve on the left hand but intact.   Brisk capillary refill to all fingers, warm and well-perfused.   Palpable radial pulse.      Pathology [facility]: hand mass consistent with superficial fibromatosis, wrist mass consistent with ganglion cyst.      ASSESSMENT: 59 year old male, 14 weeks status post left carpal tunnel release, volar ganglion excision and left palmar mass excision, doing well.      PLAN: routine " "postoperative of carpal tunnel release.  * glad to hear there has been further improvement in his carpal tunnel syndrome symptoms. Again, it may take 6 months or longer for symptoms to resolve, and in cases of severe carpal tunnel syndrome, symptoms may not completely improve. Given severity of symptoms and demyelination and loss of motor/sensory axons seen on EMG, may not completely improve as we discussed prior to surgery.  * Continue with hand and wrist exercises for motion. \"squishy\" ball.  * Scar massage and desensitization   * resume activities per comfort  * plan return to work no restrictions  * Return to clinic 8 weeks for clinical exam, sooner as needed.        Missael Mcdonald M.D., M.S.  Dept. of Orthopaedic Surgery  [facility]    Again, thank you for allowing me to participate in the care of your patient.        Sincerely,        Missael Mcdonald MD    "

## 2019-02-21 NOTE — PROGRESS NOTES
Chief Complaint   Patient presents with     Left Hand - Surgical Followup     Carpal tunnel release, volar ganglion excision, and palmar mass excision. DOS 11/13/18, 14 wk PO. Patient states his hand/wrist are doing good. Still have some numbness but a lot better than what it was.        SURGERY: left carpal tunnel release, left wrist volar ganglion excision, left hand palmar mass excision. ( [facility])  DATE OF SURGERY: 11/13/2018      HPI: Bandar Warren is a 59 year old male , right -hand dominant, who presents for post-surgical follow-up of a left carpal tunnel release, left volar ganglion excision and left hand palmar mass excision. It has been about 14 weeks since surgery. returns today overall doing well. His numbness and tingling has been improving quite a bit since last visit. He has improvements from preop.  He's going to hand therapy with improvements in strength, but knows its still weak. Has returned to work with restrictions. He feels ready to return to work.    He states the carpal tunnel syndrome is workman's comp. Works as a .     Recall his EMG done on 9/6/2018 by [facility].  Impression:  1. Severe left median neuropathy across the wrist (carpal tunnel syndrome), with severe focal motor demyelination and loss of sensory/motor axons   2. There is no evidence of brachial plexopathy or cervical radiculopathy.     OR FINDINGS  lobulated cystic mass from the volar radiocarpal joint filled with thick fluid. Thick transverse carpal ligament. 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        PAST MEDICAL HISTORY:   Past Medical History:   Diagnosis Date     NO ACTIVE PROBLEMS        PAST SURGICAL HISTORY:   Past Surgical History:   Procedure Laterality Date     APPENDECTOMY  1970's     ORTHOPEDIC SURGERY      Knee - ACL and MCL       MEDICATIONS:    No current outpatient medications on  "file.        ALLERGIES:   Allergies   Allergen Reactions     Poison Ivy Extract [Extract Of Poison Ivy]      ROS:   POSITIVE for numbness, tingling, parasthesias.   Denies headaches.   Denies fevers, chills, night sweats   Denies chest pain.   Denies shortness of breath.   Denies any skin problems, abrasions, rashes, irritation.       PHYSICAL EXAM  GENERAL APPEARANCE: healthy, alert, no distress.   SKIN: no suspicious lesions or rashes  RESPIRATORY: No increased work of breathing.  NEURO: Normal strength and tone, mentation intact and speech normal  PSYCH:  mentation appears normal and affect normal. Not anxious.      /84   Pulse 77   Resp 16   Ht 1.74 m (5' 8.5\")   Wt 80.6 kg (177 lb 9.6 oz)   SpO2 98%   BMI 26.61 kg/m       LEFT HAND / WRIST EXAM:  Degenerative changes of the fingers, notably the thumb carpometacarpal joint.  Incisions healed well with skin edges well approximated and everted.  No erythema. No drainage.    There is no swelling in the volar wrist, hand  There is no tenderness around all incisions.  There is no ecchymosis  There is no erythema of the surrounding skin.  There is no maceration of the skin.  There is no deformity in the area.    Continued decreased sensation over the distribution of the median nerve on the left hand but intact.   Brisk capillary refill to all fingers, warm and well-perfused.   Palpable radial pulse.      Pathology [facility]: hand mass consistent with superficial fibromatosis, wrist mass consistent with ganglion cyst.      ASSESSMENT: 59 year old male, 14 weeks status post left carpal tunnel release, volar ganglion excision and left palmar mass excision, doing well.      PLAN: routine postoperative of carpal tunnel release.  * glad to hear there has been further improvement in his carpal tunnel syndrome symptoms. Again, it may take 6 months or longer for symptoms to resolve, and in cases of severe carpal tunnel syndrome, symptoms may not " "completely improve. Given severity of symptoms and demyelination and loss of motor/sensory axons seen on EMG, may not completely improve as we discussed prior to surgery.  * Continue with hand and wrist exercises for motion. \"squishy\" ball.  * Scar massage and desensitization   * resume activities per comfort  * plan return to work no restrictions  * Return to clinic 8 weeks for clinical exam, sooner as needed.        Missael Mcdonald M.D., M.S.  Dept. of Orthopaedic Surgery  [facility]  "

## 2019-02-21 NOTE — LETTER
[facility]  [address]  [place] [address] [phone]  WORKABILITY    [facility], Luanne Oseguera M.D. [place]        2/21/2019      RE: Bandar Warren    [address]  [place] [address]        To whom it may concern:     Bandar Warren is under my care for s/p left carpal tunnel release, volar ganglion, and palmar mass excision.    Date of surgery: 11/13/18    Return to work date: 2/22/19     ** WITH RESTRICTIONS? No restrictions        Next appointment: 2 months        Electronically Signed (as below)  Dr. Missael Mcdonald M.D.

## 2019-04-18 ENCOUNTER — OFFICE VISIT (OUTPATIENT)
Dept: ORTHOPEDICS | Facility: CLINIC | Age: 59
End: 2019-04-18
Payer: OTHER MISCELLANEOUS

## 2019-04-18 VITALS
RESPIRATION RATE: 16 BRPM | HEIGHT: 69 IN | HEART RATE: 68 BPM | WEIGHT: 172.5 LBS | OXYGEN SATURATION: 98 % | DIASTOLIC BLOOD PRESSURE: 96 MMHG | SYSTOLIC BLOOD PRESSURE: 153 MMHG | BODY MASS INDEX: 25.55 KG/M2

## 2019-04-18 DIAGNOSIS — Z98.890 S/P CARPAL TUNNEL RELEASE: Primary | ICD-10-CM

## 2019-04-18 PROCEDURE — 99213 OFFICE O/P EST LOW 20 MIN: CPT | Performed by: ORTHOPAEDIC SURGERY

## 2019-04-18 ASSESSMENT — PAIN SCALES - GENERAL: PAINLEVEL: NO PAIN (0)

## 2019-04-18 ASSESSMENT — MIFFLIN-ST. JEOR: SCORE: 1584.89

## 2019-04-18 NOTE — PROGRESS NOTES
"Chief Complaint   Patient presents with     Left Wrist - Surgical Followup     W/C. Carpal tunnel release, volar ganglion cyst excision, and palmar mass excision. DOS 11/13/18, 5 mon PO. Patient states his wrist is doing alright. No issues.        SURGERY: left carpal tunnel release, left wrist volar ganglion excision, left hand palmar mass excision. ( [facility])  DATE OF SURGERY: 11/13/2018      HPI: Bandar Warren is a 59 year old male , right -hand dominant, who presents for post-surgical follow-up of a left carpal tunnel release, left volar ganglion excision and left hand palmar mass excision. It has been 5 months since surgery. returns today overall doing well. His numbness and tingling continues to improve, almost similar sensation to the right hand. He has significant improvements from preop.  He's been working without problems. He feels \"good to go\".    He states the carpal tunnel syndrome is workman's comp. Works as a .     Recall his EMG done on 9/6/2018 by [facility].  Impression:  1. Severe left median neuropathy across the wrist (carpal tunnel syndrome), with severe focal motor demyelination and loss of sensory/motor axons   2. There is no evidence of brachial plexopathy or cervical radiculopathy.     OR FINDINGS  lobulated cystic mass from the volar radiocarpal joint filled with thick fluid. Thick transverse carpal ligament. 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        PAST MEDICAL HISTORY:   Past Medical History:   Diagnosis Date     NO ACTIVE PROBLEMS        PAST SURGICAL HISTORY:   Past Surgical History:   Procedure Laterality Date     APPENDECTOMY  1970's     ORTHOPEDIC SURGERY      Knee - ACL and MCL       MEDICATIONS:    No current outpatient medications on file.        ALLERGIES:   Allergies   Allergen Reactions     Poison Ivy Extract [Extract Of Poison Ivy]  " "    ROS:   POSITIVE for numbness, tingling, parasthesias.   Denies headaches.   Denies fevers, chills, night sweats   Denies chest pain.   Denies shortness of breath.   Denies any skin problems, abrasions, rashes, irritation.       PHYSICAL EXAM  GENERAL APPEARANCE: healthy, alert, no distress.   SKIN: no suspicious lesions or rashes  RESPIRATORY: No increased work of breathing.  NEURO: Normal strength and tone, mentation intact and speech normal  PSYCH:  mentation appears normal and affect normal. Not anxious.      BP (!) 153/96   Pulse 68   Resp 16   Ht 1.74 m (5' 8.5\")   Wt 78.2 kg (172 lb 8 oz)   SpO2 98%   BMI 25.85 kg/m       LEFT HAND / WRIST EXAM:  Degenerative changes of the fingers, notably the thumb carpometacarpal joint.  Incisions healed well with skin edges well approximated and everted.  No erythema. No drainage.    There is no swelling in the volar wrist, hand  There is no tenderness around all incisions.  There is no ecchymosis  There is no erythema of the surrounding skin.  There is no maceration of the skin.  There is no deformity in the area.    Minimal decreased sensation over the distribution of the median nerve on the left hand but intact.   Brisk capillary refill to all fingers, warm and well-perfused.   Palpable radial pulse.      Pathology [facility]: hand mass consistent with superficial fibromatosis, wrist mass consistent with ganglion cyst.      ASSESSMENT: 59 year old male, 5 months status post left carpal tunnel release, volar ganglion excision and left palmar mass excision, doing well.      PLAN: routine postoperative of carpal tunnel release.  * glad to hear there has been further improvement in his carpal tunnel syndrome symptoms. Again, it may take 6 months or longer for symptoms to resolve, and in cases of severe carpal tunnel syndrome, symptoms may not completely improve. Given severity of symptoms and demyelination and loss of motor/sensory axons seen on EMG, may " "not completely improve as we discussed prior to surgery.  * Continue with hand and wrist exercises for motion. \"squishy\" ball.  * Scar massage and desensitization   * resume activities per comfort  * continue work without restrictions.  * activities per comfort.  * return to clinic as needed.  * reached maximum medical improvement.    Badnar to follow up with Primary Care provider regarding elevated blood pressure.      Missael Mcdonald M.D., M.S.  Dept. of Orthopaedic Surgery  [facility]  "

## 2019-04-18 NOTE — LETTER
"    4/18/2019         RE: Bandar Warren  [address]  [place] [address]        Dear Colleague,    Thank you for referring your patient, Bandar Warren, to the [facility] [place]. Please see a copy of my visit note below.    Chief Complaint   Patient presents with     Left Wrist - Surgical Followup     W/C. Carpal tunnel release, volar ganglion cyst excision, and palmar mass excision. DOS 11/13/18, 5 mon PO. Patient states his wrist is doing alright. No issues.        SURGERY: left carpal tunnel release, left wrist volar ganglion excision, left hand palmar mass excision. ( [facility])  DATE OF SURGERY: 11/13/2018      HPI: Bnadar Warren is a 59 year old male , right -hand dominant, who presents for post-surgical follow-up of a left carpal tunnel release, left volar ganglion excision and left hand palmar mass excision. It has been 5 months since surgery. returns today overall doing well. His numbness and tingling continues to improve, almost similar sensation to the right hand. He has significant improvements from preop.  He's been working without problems. He feels \"good to go\".    He states the carpal tunnel syndrome is workman's comp. Works as a .     Recall his EMG done on 9/6/2018 by [facility].  Impression:  1. Severe left median neuropathy across the wrist (carpal tunnel syndrome), with severe focal motor demyelination and loss of sensory/motor axons   2. There is no evidence of brachial plexopathy or cervical radiculopathy.     OR FINDINGS  lobulated cystic mass from the volar radiocarpal joint filled with thick fluid. Thick transverse carpal ligament. 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        PAST MEDICAL HISTORY:   Past Medical History:   Diagnosis Date     NO ACTIVE PROBLEMS        PAST SURGICAL HISTORY:   Past Surgical History:   Procedure " "Laterality Date     APPENDECTOMY  1970's     ORTHOPEDIC SURGERY      Knee - ACL and MCL       MEDICATIONS:    No current outpatient medications on file.        ALLERGIES:   Allergies   Allergen Reactions     Poison Ivy Extract [Extract Of Poison Ivy]      ROS:   POSITIVE for numbness, tingling, parasthesias.   Denies headaches.   Denies fevers, chills, night sweats   Denies chest pain.   Denies shortness of breath.   Denies any skin problems, abrasions, rashes, irritation.       PHYSICAL EXAM  GENERAL APPEARANCE: healthy, alert, no distress.   SKIN: no suspicious lesions or rashes  RESPIRATORY: No increased work of breathing.  NEURO: Normal strength and tone, mentation intact and speech normal  PSYCH:  mentation appears normal and affect normal. Not anxious.      BP (!) 153/96   Pulse 68   Resp 16   Ht 1.74 m (5' 8.5\")   Wt 78.2 kg (172 lb 8 oz)   SpO2 98%   BMI 25.85 kg/m        LEFT HAND / WRIST EXAM:  Degenerative changes of the fingers, notably the thumb carpometacarpal joint.  Incisions healed well with skin edges well approximated and everted.  No erythema. No drainage.    There is no swelling in the volar wrist, hand  There is no tenderness around all incisions.  There is no ecchymosis  There is no erythema of the surrounding skin.  There is no maceration of the skin.  There is no deformity in the area.    Minimal decreased sensation over the distribution of the median nerve on the left hand but intact.   Brisk capillary refill to all fingers, warm and well-perfused.   Palpable radial pulse.      Pathology [facility]: hand mass consistent with superficial fibromatosis, wrist mass consistent with ganglion cyst.      ASSESSMENT: 59 year old male, 5 months status post left carpal tunnel release, volar ganglion excision and left palmar mass excision, doing well.      PLAN: routine postoperative of carpal tunnel release.  * glad to hear there has been further improvement in his carpal tunnel " "syndrome symptoms. Again, it may take 6 months or longer for symptoms to resolve, and in cases of severe carpal tunnel syndrome, symptoms may not completely improve. Given severity of symptoms and demyelination and loss of motor/sensory axons seen on EMG, may not completely improve as we discussed prior to surgery.  * Continue with hand and wrist exercises for motion. \"squishy\" ball.  * Scar massage and desensitization   * resume activities per comfort  * continue work without restrictions.  * activities per comfort.  * return to clinic as needed.  * reached maximum medical improvement.    Bandar to follow up with Primary Care provider regarding elevated blood pressure.      Missael Mcdonald M.D., M.S.  Dept. of Orthopaedic Surgery  [facility]    Again, thank you for allowing me to participate in the care of your patient.        Sincerely,        Missael Mcdonald MD    "